# Patient Record
Sex: FEMALE | Race: WHITE | URBAN - METROPOLITAN AREA
[De-identification: names, ages, dates, MRNs, and addresses within clinical notes are randomized per-mention and may not be internally consistent; named-entity substitution may affect disease eponyms.]

---

## 2020-08-04 ENCOUNTER — HOSPITAL ENCOUNTER (OUTPATIENT)
Dept: BEHAVIORAL HEALTH | Facility: CLINIC | Age: 55
Discharge: HOME | End: 2020-08-04
Attending: PSYCHIATRY & NEUROLOGY | Admitting: PSYCHIATRY & NEUROLOGY
Payer: COMMERCIAL

## 2020-08-04 PROCEDURE — 90791 PSYCH DIAGNOSTIC EVALUATION: CPT | Mod: 95 | Performed by: COUNSELOR

## 2020-08-05 ENCOUNTER — MYC MEDICAL ADVICE (OUTPATIENT)
Dept: PSYCHOLOGY | Facility: CLINIC | Age: 55
End: 2020-08-05

## 2020-08-05 ENCOUNTER — BEH TREATMENT PLAN (OUTPATIENT)
Dept: BEHAVIORAL HEALTH | Facility: CLINIC | Age: 55
End: 2020-08-05
Attending: PSYCHIATRY & NEUROLOGY

## 2020-08-05 DIAGNOSIS — F43.23 ADJUSTMENT DISORDER WITH MIXED ANXIETY AND DEPRESSED MOOD: ICD-10-CM

## 2020-08-05 RX ORDER — BUPROPION HYDROCHLORIDE 150 MG/1
150 TABLET ORAL EVERY MORNING
Status: ON HOLD | COMMUNITY
End: 2023-07-15

## 2020-08-06 NOTE — PROGRESS NOTES
"Outpatient Mental Health Services - Adult    MY COPING PLAN FOR SAFETY    PATIENT'S NAME: Kallie Estrada  MRN:   [id number]     SAFETY PLAN:    Step 1: Warning signs / cues (Thoughts, images, mood, situation, behavior) that a crisis may be developing:      Thoughts: \"Nothing makes it better\" She feels she lost her will to live.     Images: flashbacks    Thinking Processes: ruminations (can't stop thinking about my problems) and intrusive thoughts (bothersome, unwanted thoughts that come out of nowhere)    Mood: worsening depression, hopelessness, helplessness and intense worry    Behaviors: using alcohol, not taking care of myself, not taking care of my responsibilities, sleeping too much and not sleeping enough    Situations: loss, relationship problems, trauma  and relapse       Step 2: Coping strategies - Things I can do to take my mind off of my problems without contacting another person (relaxation technique, physical activity):      Distress Tolerance Strategies:  watch a movie    Physical Activities: go for a walk, redecorate    Focus on helpful thoughts:  \"This is temporary\" and \"I will get through this\"    Step 3: People and social settings that provide distraction:     Name: oldest daughter Phone: in phone   Name: new male friend Phone: in phone   park     Step 4: Remind myself of people and things that are important to me and worth living for:  Her daughters.     Step 5: When I am in crisis, I can ask these people to help me use my safety plan:     Name: new male friend Phone: in phone   Name: sister Phone: in phone    Step 6: Making the environment safe:       remove alcohol and be around others    Step 7: Professionals or agencies I can contact during a crisis:      Suicide Prevention Lifeline: 4-693-558-TALK (4879)    Crisis Text Line Service (available 24 hours a day, 7 days a week): Text [place] to 221435    Call  **CRISIS (831106) from a cell phone to talk to a team of professionals who can help " you.    Crisis Services By County: Phone Number:   [place]     [phone]   [place]    [phone]   [place]    [phone]   [place]    [phone]   [place]    [phone]   [place] [phone]   [place]     [phone]       Call 911 or go to my nearest emergency department.     I helped develop this safety plan and agree to use it when needed.  I have been given a copy of this plan.        Today s date:  8/4/2020  Adapted from Safety Plan Template 2008 Camila Hernandez and Emilio Carter is reprinted with the express permission of the authors.  No portion of the Safety Plan Template may be reproduced without the express, written permission.  You can contact the authors at [email or web address] or [email or web address]

## 2020-08-10 ENCOUNTER — TELEPHONE (OUTPATIENT)
Dept: BEHAVIORAL HEALTH | Facility: CLINIC | Age: 55
End: 2020-08-10

## 2020-08-10 ASSESSMENT — ANXIETY QUESTIONNAIRES
5. BEING SO RESTLESS THAT IT IS HARD TO SIT STILL: NOT AT ALL
2. NOT BEING ABLE TO STOP OR CONTROL WORRYING: NOT AT ALL
GAD7 TOTAL SCORE: 4
1. FEELING NERVOUS, ANXIOUS, OR ON EDGE: NEARLY EVERY DAY
1. FEELING NERVOUS, ANXIOUS, OR ON EDGE: NEARLY EVERY DAY
3. WORRYING TOO MUCH ABOUT DIFFERENT THINGS: NOT AT ALL
IF YOU CHECKED OFF ANY PROBLEMS ON THIS QUESTIONNAIRE, HOW DIFFICULT HAVE THESE PROBLEMS MADE IT FOR YOU TO DO YOUR WORK, TAKE CARE OF THINGS AT HOME, OR GET ALONG WITH OTHER PEOPLE: SOMEWHAT DIFFICULT
7. FEELING AFRAID AS IF SOMETHING AWFUL MIGHT HAPPEN: NOT AT ALL
3. WORRYING TOO MUCH ABOUT DIFFERENT THINGS: NOT AT ALL
IF YOU CHECKED OFF ANY PROBLEMS ON THIS QUESTIONNAIRE, HOW DIFFICULT HAVE THESE PROBLEMS MADE IT FOR YOU TO DO YOUR WORK, TAKE CARE OF THINGS AT HOME, OR GET ALONG WITH OTHER PEOPLE: SOMEWHAT DIFFICULT
5. BEING SO RESTLESS THAT IT IS HARD TO SIT STILL: NOT AT ALL
GAD7 TOTAL SCORE: 4
2. NOT BEING ABLE TO STOP OR CONTROL WORRYING: NOT AT ALL
6. BECOMING EASILY ANNOYED OR IRRITABLE: SEVERAL DAYS
7. FEELING AFRAID AS IF SOMETHING AWFUL MIGHT HAPPEN: NOT AT ALL
6. BECOMING EASILY ANNOYED OR IRRITABLE: SEVERAL DAYS

## 2020-08-10 ASSESSMENT — PATIENT HEALTH QUESTIONNAIRE - PHQ9
5. POOR APPETITE OR OVEREATING: NOT AT ALL
SUM OF ALL RESPONSES TO PHQ QUESTIONS 1-9: 8
5. POOR APPETITE OR OVEREATING: NOT AT ALL

## 2020-08-10 NOTE — PROGRESS NOTES
"Outpatient Mental Health Services - Adult     MY COPING PLAN FOR SAFETY     PATIENT'S NAME:    Kallie Estrada  MRN:                           [id number]     SAFETY PLAN:     Step 1: Warning signs / cues (Thoughts, images, mood, situation, behavior) that a crisis may be developing:     ? Thoughts: \"Nothing makes it better\" She feels she lost her will to live.   ? Images: flashbacks  ? Thinking Processes: ruminations (can't stop thinking about my problems) and intrusive thoughts (bothersome, unwanted thoughts that come out of nowhere)  ? Mood: worsening depression, hopelessness, helplessness and intense worry  ? Behaviors: using alcohol, not taking care of myself, not taking care of my responsibilities, sleeping too much and not sleeping enough  ? Situations: loss, relationship problems, trauma  and relapse   ?    Step 2: Coping strategies - Things I can do to take my mind off of my problems without contacting another person (relaxation technique, physical activity):     ? Distress Tolerance Strategies:  watch a movie  ? Physical Activities: go for a walk, redecorate  ? Focus on helpful thoughts:  \"This is temporary\" and \"I will get through this\"     Step 3: People and social settings that provide distraction:                 Name: oldest daughter           Phone: in phone              Name: new male friend           Phone: in phone              park          Step 4: Remind myself of people and things that are important to me and worth living for:  Her daughters.      Step 5: When I am in crisis, I can ask these people to help me use my safety plan:                 Name: new male friend           Phone: in phone              Name: sister    Phone: in phone     Step 6: Making the environment safe:      ? remove alcohol and be around others     Step 7: Professionals or agencies I can contact during a crisis:     ? Suicide Prevention Lifeline: 0-372-134-TALK (2222)  ? Crisis Text Line Service (available 24 hours a day, " 7 days a week): Text [place] to 824604  ? Call  **CRISIS (119628) from a cell phone to talk to a team of professionals who can help you.          Crisis Services By [facility]: Phone Number:   [place]     [phone]   [place]    [phone]   [place]    [phone]   [place]    [phone]   [place]    [phone]   [place] [phone]   [place]     [phone]      ? Call 911 or go to my nearest emergency department.             I helped develop this safety plan and agree to use it when needed.  I have been given a copy of this plan.          Today s date:  8/4/2020  Adapted from Safety Plan Template 2008 Camila Hernandez and Emilio Carter is reprinted with the express permission of the authors.  No portion of the Safety Plan Template may be reproduced without the express, written permission.  You can contact the authors at [email or web address] or [email or web address]

## 2020-08-10 NOTE — TELEPHONE ENCOUNTER
Admission Date: 8/11/2020    Identify any current concerns with potential impact to admission:     medication/medical concerns: No     immediate safety concerns:No    Does patient have safety plan? Yes  Note: Please copy safety plan copied into BEH Encounter     Other (insurance/childcare/transportation/housing/planned absences/etc): N/A    Patient's insurance is: BCBS . Does patient need appointment with provider? No    If patient has Medical Assistance (MA) is LOCUS and Functional Assessment completed? N/A    If patient is in Partial Hospitalization Program is LOCUS completed? N/A                                                                              Completed by: Octaviano NEELY/RICHAR

## 2020-08-11 ENCOUNTER — HOSPITAL ENCOUNTER (OUTPATIENT)
Dept: BEHAVIORAL HEALTH | Facility: CLINIC | Age: 55
End: 2020-08-11
Attending: PSYCHIATRY & NEUROLOGY
Payer: COMMERCIAL

## 2020-08-11 PROBLEM — F43.23 ADJUSTMENT DISORDER WITH MIXED ANXIETY AND DEPRESSED MOOD: Status: ACTIVE | Noted: 2020-08-11

## 2020-08-11 PROCEDURE — G0177 OPPS/PHP; TRAIN & EDUC SERV: HCPCS | Mod: 95

## 2020-08-11 PROCEDURE — 90853 GROUP PSYCHOTHERAPY: CPT | Mod: GT

## 2020-08-11 ASSESSMENT — ANXIETY QUESTIONNAIRES: GAD7 TOTAL SCORE: 4

## 2020-08-11 ASSESSMENT — PATIENT HEALTH QUESTIONNAIRE - PHQ9
SUM OF ALL RESPONSES TO PHQ QUESTIONS 1-9: 6
SUM OF ALL RESPONSES TO PHQ QUESTIONS 1-9: 6

## 2020-08-11 NOTE — PROGRESS NOTES
Adult Day Treatment Program:  Individualized Treatment Plan     Date of Plan: 20    Name: Kallie Estrada MRN: [id number]    : 1965    Programs:  Adult Day Treatment (ADT)     Clinical Track (if applicable):  2B    DSM5 Diagnosis  309.28 (F43.23) With mixed anxiety and depressed mood. 311 (F32.9) Unspecified Depressive Disorder      303.90 (F10.20) Substance-Related & Addictive Disorders Alcohol Use Disorder- Severe    Adult Day Treatment Program Multidisciplinary Team Members: Dima Giraldo MD and/or Dr. Neeta Anderson; Tayler [facility]iraldo [facility], Rickey Valdez, MOTR/L, Octaviano Meeks, OTR/L, Citlali Townsend RN, BSN    Kallie Estrada will participate in the Adult Day Treatment Program  3 days per week, 3 hours per day. Anticipated duration/discharge: 12 weeks    Due to COVID-19, services will be delivered via telemedicine until further notice.     Program Start Date: 20  Anticipated Discharge Date: 20 (pending authorization/clinical changes)    NOTE: Complete CGI     Review Date: Does Kallie Estrada continue to meet criteria to participate in the Adult Day Treatment Program, 3 days per week; 3 hours per day?   10/13/20 yes discharge 20                               Client Strengths:  has a previous history of therapy and support of family, friends and providers    Client Participation in Plan:  Contributed to goals and plan     Areas of Vulnerability:  Anxiety  Depressive symptoms   Trauma/Abuse/Neglect    Long-Term Goals:  Knowledge about illness and management of symptoms   Maintenance of personal safety   Maintenance of sobriety     Abuse Prevention Plan:  Safe, therapeutic environment   Safety coping plan as needed   Education regarding illness and skill development   Coordination with care providers     Discharge Criteria:  Satisfactory progress toward treatment goals   Improvement re: identified problems and symptoms   Ability to continue recovery at next level of service    Has a discharge plan in place   Has safety/coping plan in place   Ability to maintain sobriety     Areas of Treatment Focus        Area of Treatment Focus:   Personal Safety  Start Date:    8/18/20    Goal:  Target Date: 10/13/20 Status: Stopped  Kallie will notify staff when needing assistance to develop or implement a coping plan to manage suicidal or self injurious urges.Use coping plan for safety, as needed.  Maintain sobriety.      Progress:   8/24/20 - Pt will be going into a JAREK program through [facility].  Goals Stopped and Pt discharged.        Treatment Strategies:   Assess / reassess level of potential for harm to self or others  Engage in safety planning when indicated  Facilitate increased self awareness          Area of Treatment Focus:   Symptom Stabilization and Management  Start Date:    8./18/20    Goal:  Target Date: 10/13/20 Status: Stopped  When in life skills Kallie  will provide an update related to perceived progress with mental health recovery weekly.      Progress:    8/24/20 - Pt will be going into a JAREK program through [facility].  Goals Stopped and Pt discharged.        Treatment Strategies:   Facilitate increased self awareness  Provide education regarding startegies and coping skills to assist with mental health recovery            Area of Treatment Focus:   Wellness and Mental Health  Start Date:    8/18/20    Goal:  Target Date: 10/13/20 Status: Stopped  Kallie will improve wellness related behaviors by getting enough sleep,exercise, balanced nutrition and take medications (if prescribed) to maintain good mental health.      Progress:   8/24/20 - Pt will be going into a JAREK program through [facility].  Goals Stopped and Pt discharged.           Treatment Strategies:   Facilitate increased self awareness  Provide education regarding wellness habits and routines which can asssit with mental health recovery            Area of Treatment Focus:   Community  "Resources / Support and Discharge Planning  Start Date:    8/18/20    Goal:  Target Date: 10/13/20 Status: Stopped  Kallie will establish an aftercare plan to include medical providers and social supports by discharge.      Progress:   8/24/20 - Pt will be going [facility]  Goals Stopped and Pt discharged.           Treatment Strategies:   Facilitate increased self awareness  Provide education regarding relapse prevention,discharge planning and mental health support          Area of Treatment Focus:   Symptom Stabilization and Management  Start Date:    8/18/20    Goal:  Target Date: 10/13/20 Status: Stopped        Progress:   Not applicable        Treatment Strategies:        Jeff Meeks, OTR/L      NOTE: Required signatures are completed manually and scanned into the electronic medical record. See \"Media\" tab in epic.    The Individualized Treatment Plan Signature Page has been routed to the provider for co-sign.      "

## 2020-08-11 NOTE — GROUP NOTE
Psychotherapy Group Note    PATIENT'S NAME: Kallie Estrada  MRN:   [id number]  :   1965  ACCT. NUMBER: [id number]  DATE OF SERVICE: 20  START TIME:  3:00 PM  END TIME:  3:50 PM  FACILITATOR: Tayler Giraldo  TOPIC:  EBP Group: Cognitive Restructuring  Adult Mental Health Day Treatment  TRACK: 2B    NUMBER OF PARTICIPANTS: 7    Summary of Group / Topics Discussed:  Cognitive Restructuring: Core Beliefs: Patients received an overview of what a core belief is, and how they develop. Patients then began to identify their negative core beliefs. Patients worked to modify core beliefs with the goal of improved self-image and functioning.     Patient Session Goals / Objectives:    Familiarize self with the concept of core beliefs and how they develop.      Explore personal core beliefs (positive and negative)    Develop / advance recognition of the connection between negative thoughts and negative core beliefs.    Formulate new neutral/positive core beliefs               Patient Participation / Response:  Minimally participated, only when prompted / asked.    Demonstrated understanding of topics discussed through group discussion and participation    Treatment Plan:  Patient has an initial individualized treatment plan that was created as part of their diagnostic assessment / admission process.  A master individualized treatment plan is in the process of being developed with the patient and multi-disciplinary care team.    Tayler Giraldo

## 2020-08-11 NOTE — GROUP NOTE
Process Group Note    PATIENT'S NAME: Kallie Estrada  MRN:   [id number]  :   1965  ACCT. NUMBER: [id number]  DATE OF SERVICE: 20  START TIME:  1:00 PM  END TIME:  1:50 PM  FACILITATOR: Tayler Giraldo  TOPIC:  Process Group    Diagnoses:  309.28 (F43.23) With mixed anxiety and depressed mood. 311 (F32.9) Unspecified Depressive Disorder      303.90 (F10.20) Substance-Related & Addictive Disorders Alcohol Use Disorder- Severe    Adult Mental Health Day Treatment  TRACK: 2B    Telemedicine Visit: The patient's condition can be safely assessed and treated via synchronous audio and visual telemedicine encounter.      Reason for Telemedicine Visit:  covid 19    Originating Site (Patient Location): Patient's home    Distant Site (Provider Location): Provider Remote Setting    Consent:  The patient/guardian has verbally consented to: the potential risks and benefits of telemedicine (video visit) versus in person care; bill my insurance or make self-payment for services provided; and responsibility for payment of non-covered services.     Mode of Communication:  Video Conference via [employer]    As the provider I attest to compliance with applicable laws and regulations related to telemedicine.      NUMBER OF PARTICIPANTS: 7          Data:    Session content: At the start of this group, patients were invited to check in by identifying themselves, describing their current emotional status, and identifying issues to address in this group.   Area(s) of treatment focus addressed in this session included Symptom Management, Personal Safety, Develop / Improve Independent Living Skills and Develop Socialization / Interpersonal Relationship Skills.  Kallie reported being sober 10 years and relapsing after a series of stressful events including her ex  had twins in his late 50s with his young girlfriend and she and their daughters did not find out until a month before the babies were due, a friend ,  among other stressors.  She presented as tangential and difficult to redirect.  She reported a good relationship with her daughters and denied suicidal ideation.    Therapeutic Interventions/Treatment Strategies:  Psychotherapist offered support, feedback and validation and reinforced use of skills. Treatment modalities used include Motivational Interviewing, Cognitive Behavioral Therapy and Dialectical Behavioral Therapy. Validated and normalized.  Highlighted and reinforced skills used; connected to positive outcomes.  Provided additional skill suggestions.  Aided in creating a plan to help work towards goals.        Assessment:    Patient response:   Patient responded to session by accepting feedback, listening and accepting support    Possible barriers to participation / learning include: and no barriers identified    Health Issues:   None reported       Substance Use Review:   Substance Use: No active concerns identified.    Mental Status/Behavioral Observations  Appearance:   Appropriate   Eye Contact:   Good   Psychomotor Behavior: Normal   Attitude:   Cooperative   Orientation:   All  Speech   Rate / Production: Talkative   Volume:  Normal   Mood:    Depressed   Affect:    Appropriate   Thought Content:   Rumination  Thought Form:  Flight of Ideas  Tangential     Insight:    Fair     Plan:     Safety Plan: No current safety concerns identified.  Recommended that patient call 911 or go to the local ED should there be a change in any of these risk factors.     Barriers to treatment: None identified    Patient Contracts (see media tab):  None    Substance Use: Not addressed in session     Continue or Discharge: Patient will continue in Adult Day Treatment (ADT)  as planned. Patient is likely to benefit from learning and using skills as they work toward the goals identified in their treatment plan.      Tayler Giraldo  August 11, 2020

## 2020-08-11 NOTE — GROUP NOTE
Psychoeducation Group Note    PATIENT'S NAME: Kallie Estrada  MRN:   [id number]  :   1965  ACCT. NUMBER: [id number]  DATE OF SERVICE: 20  START TIME:  2:00 PM  END TIME:  2:50 PM  FACILITATOR: Rickey Valdez OT  TOPIC: MH Life Skills Group: Sensory Approaches in Mental Health  Adult Mental Health Day Treatment  TRACK: 2B    Telemedicine Visit: The patient's condition can be safely assessed and treated via synchronous audio and visual telemedicine encounter.      Reason for Telemedicine Visit: Services only offered telehealth and Covid 19    Originating Site (Patient Location): Patient's home    Distant Site (Provider Location): [facility]: [facility]    Consent:  The patient/guardian has verbally consented to: the potential risks and benefits of telemedicine (video visit) versus in person care; bill my insurance or make self-payment for services provided; and responsibility for payment of non-covered services.     Mode of Communication:  Video Conference via Outcome Referrals    As the provider I attest to compliance with applicable laws and regulations related to telemedicine.      NUMBER OF PARTICIPANTS: 7    Summary of Group / Topics Discussed:  Sensory Approaches in Mental Health:  Neurosensory Enhanced Mindfulness: Summoning Seasonal Sensory Memories for self regulation.      Patients were provided with psychoeducation on how using sensory memories can be a helpful strategy to improve a moment or low mood by encouraging a positive thought process using the principles of neuroplasticity. Using the four seasons as a framework, patients were provided with an experiential opportunity to explore and tune into and identify specific soothing or engaging sensory input for each season. They then identified a positive sensory memory for each season to summon in the future when they would benefit from a positive thought process to help self regulate. Also explored was the benefit of  daily/weekly/seasonal activity rhythms and how they help us pass our time and organize our brain to maintain mental health. Facilitated reflection with group members and validation provided.        Patient Session Goals / Objectives:    Identified how using sensory enhanced mindfulness practices can be used for grounding, stress management, and self regulation      Improved awareness of different types of sensory enhanced mindfulness activities that assist with healthy coping of stress and symptoms      Established a plan for practice of these skills in their own environments    Practiced and reflected on how to generalize taught skills to their everyday life        Patient Participation / Response:  Moderately participated, sharing some personal reflections / insights and adequately adequately received / provided feedback with other participants.    Patient presentation: First day in program. Shared appropriately. , Verbalized understanding of content and Patient would benefit from additional opportunities to practice the content to be able to generalize it to their everyday life with increased intentionality, consistency, and efficacy in support of their psychiatric recovery    Treatment Plan:  Patient has an initial individualized treatment plan that was created as part of their diagnostic assessment / admission process.  A master individualized treatment plan is in the process of being developed with the patient and multi-disciplinary care team.    Rickey Valdez, OT

## 2020-08-11 NOTE — DISCHARGE SUMMARY
"       Adult Mental Health Intensive Outpatient Discharge Summary/Instructions      Patient: Kallie Estrada MRN: [id number]   : 1965 Age: 55 year old Sex: female     Admission Date: 20  Discharge Date: 20    Diagnosis: 309.28 (F43.23) With mixed anxiety and depressed mood. 311 (F32.9) Unspecified Depressive Disorder      303.90 (F10.20) Substance-Related & Addictive Disorders Alcohol Use Disorder- Severe    Focus of Treatment / Progress    Personal Safety: Patient discharged to pursue [facility].      * Follow your safety plan     * Call crisis lines as needed:    [facility] [phone] [facility] [phone]  [facility] [phone] Crisis Connection [phone]  [facility] [phone] [facility] [phone]  [facility] [phone] National Suicide Prevention [phone]  [facility] [phone] Suicide Prevention [phone]  [facility] [phone]    Managing symptoms of:  Anxiety, depression, Alcohol use d/o    Community support/health:  [place] [address] ([phone]) [email or web address], [place] Crisis text line( Text [place] to 480487),  [facility]  [place], [place] ([phone])  [facility] [place], [place] ( [phone])     Managing Symptoms and Preventing Relapse    * Go to all of your appointments    * Take all medications as directed.      * Carry a current list if medication with you    * Do not use illicit (street) drugs.  Avoid alcohol    * Report these symptoms to your care team. These are early signs of relapse:   Thoughts of suicide   Losing more sleep   Increased confusion   Mood getting worse   Feeling more aggressive   Other:  As noted    *Use these skills daily:  Talk to someone you trust at least one time weekly, set boundaries and say \"no\", be assertive, act opposite of negative feelings, accept challenges with a positive attitude, exercise at least three times per week for " 30 minutes,  get enough sleep, eat healthy foods, get into a good routine    Copy of summary sent to: In Epic    Follow up with psychiatrist / main caregiver: See chart    Next visit: see chart, unable to connect with patient    Follow up with your therapist: see chart   Next visit: see chart    Go to group therapy and / or support groups at: [facility] Connection and Depression Bipolar Support [place](DBSA) support groups    See your medical doctor about:  For an annual physical exam or any general wellness or illness as needed.    Other:  Your treatment team appreciates having the opportunity to work with you and wishes you the best.    Client Signature:__Unable to sign in person d/t [id number]___________  Date / Time:__8/26/20, 2:15 pm_________  Staff Signature:___Citlali Townsend RN on 8/26/2020 at 2:16 PM__   Date / Time:___________

## 2020-08-11 NOTE — PROGRESS NOTES
Acknowledgement of Current Treatment Plan       I have reviewed my treatment plan with my therapist / counselor on 8/18/20.   I agree with the plan as it is written in the electronic health record. (2B)    Name:      Signature:  Kallie Estrada   Unable to sign in person d/t COVID19   Dr Dima Giraldo MD  Psychiatrist    Dr. Neeta Anderson, [facility],     Tayler Giraldo, [facility]  Psychotherapist

## 2020-08-12 ASSESSMENT — PATIENT HEALTH QUESTIONNAIRE - PHQ9: SUM OF ALL RESPONSES TO PHQ QUESTIONS 1-9: 6

## 2020-08-13 ENCOUNTER — TELEPHONE (OUTPATIENT)
Dept: BEHAVIORAL HEALTH | Facility: CLINIC | Age: 55
End: 2020-08-13

## 2020-08-13 NOTE — TELEPHONE ENCOUNTER
Writer called to conduct RN health screen. Unable to leave  requesting call back d/t  mailbox being full. Will reattempt.  Citlali Townsend RN on 8/13/2020 at 9:33 AM

## 2020-08-20 ENCOUNTER — HOSPITAL ENCOUNTER (OUTPATIENT)
Dept: BEHAVIORAL HEALTH | Facility: CLINIC | Age: 55
Discharge: HOME | End: 2020-08-20
Attending: FAMILY MEDICINE | Admitting: FAMILY MEDICINE
Payer: COMMERCIAL

## 2020-08-20 ENCOUNTER — TELEPHONE (OUTPATIENT)
Dept: BEHAVIORAL HEALTH | Facility: CLINIC | Age: 55
End: 2020-08-20

## 2020-08-20 VITALS — BODY MASS INDEX: 22.71 KG/M2 | WEIGHT: 133 LBS | HEIGHT: 64 IN

## 2020-08-20 DIAGNOSIS — F10.20 ALCOHOL USE DISORDER, SEVERE, DEPENDENCE (H): ICD-10-CM

## 2020-08-20 PROCEDURE — H0001 ALCOHOL AND/OR DRUG ASSESS: HCPCS | Mod: HF,95

## 2020-08-20 ASSESSMENT — PAIN SCALES - GENERAL: PAINLEVEL: NO PAIN (0)

## 2020-08-20 ASSESSMENT — ANXIETY QUESTIONNAIRES
GAD7 TOTAL SCORE: 1
3. WORRYING TOO MUCH ABOUT DIFFERENT THINGS: SEVERAL DAYS
1. FEELING NERVOUS, ANXIOUS, OR ON EDGE: NOT AT ALL
2. NOT BEING ABLE TO STOP OR CONTROL WORRYING: NOT AT ALL
5. BEING SO RESTLESS THAT IT IS HARD TO SIT STILL: NOT AT ALL
6. BECOMING EASILY ANNOYED OR IRRITABLE: NOT AT ALL
4. TROUBLE RELAXING: NOT AT ALL
7. FEELING AFRAID AS IF SOMETHING AWFUL MIGHT HAPPEN: NOT AT ALL

## 2020-08-20 ASSESSMENT — PATIENT HEALTH QUESTIONNAIRE - PHQ9: SUM OF ALL RESPONSES TO PHQ QUESTIONS 1-9: 2

## 2020-08-20 ASSESSMENT — MIFFLIN-ST. JEOR: SCORE: 1183.28

## 2020-08-20 NOTE — PROGRESS NOTES
8/20/2020    Kallie [email or web address]    Dear Kallie,      It was a pleasure meeting with you today on August 20, 2020 for your Chemical Dependency Evaluation.      Based on your evaluation the recommendations are as follows:       1) Complete a Co-occurring Intensive Outpatient CD Treatment Program, such as those offered by [facility].  2)Continue to work your current recovery program but be mindful of becoming complacent in your recovery.   3)  Abstain from all mood-altering chemicals unless prescribed by a licensed provider.   4)  Actively work with a female mentor/sponsor on a weekly basis.   5)  Follow all the recommendations of your treatment/medical providers.  6)  Establish care with a psychiatrist and attend individual psychotherapy appointments.    7)  Continue to use your Sober Link breathalyzer.  8)  Should you relapse, consider a higher level of care and perhaps anti-craving medication.    You have the number for [facility] to establish care with a therapist ([phone]).      An Intensive Outpatient counselor should be contacting you within a day or two.     If I can be of further service, please don't hesitate to e-mail me or leave a message at my office.     Sincerely wishing you the very best,       Nina Mims, MARTITA, LADC,CI  CD Evaluation Counselor  [facility]  [address] [place] [id number]  [email or web address]  Tel: [phone]  Fax: [phone]  Gender pronouns: she/hers  Employed by: [employer]    (securely e-mailed to the pt on 8/20/2020)

## 2020-08-20 NOTE — PROGRESS NOTES
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 her triggers as loneliness, her Ex and a desire to be numb from her feelings.  Pt is in need of sober coping skills. Pt is at some risk for relapse/continued use based on the struggles she is experiencing emotionally.  She currently has a breathalyzer she uses 4x a day and she feels that accountability is very helpful.       DIMENSION VI - Recovery Environment   1. Are you employed/attending school? Tell me about that.     The pt is currently unemployed.    2A. Describe a typical day; evening for you. Work, school, social, leisure, volunteer, spiritual practices. Include time spent obtaining, using, recovering from drugs or alcohol. (DSM)     \"Since I stopped drinking, I am back to being a Mom.  We have several kids over.  I am busy taking my daughters to their sports activities.  My kids are growing up and moving on and I am feeling lonely.\"    Please describe what leisure activities have been associated with your substance abuse:     \"In the past it was very social.  Recently, I would drink alone.\"    2B. How often do you spend more time than you planned using or use more than you planned? (DSM)     \"It had been daily.\"    3. How important is using to your social connections? Do many of your family or friends use? " "    '\"It really is not.\"    4A. Are you currently in a significant relationship?     No, I have been  for 10 years.    4C. Sexual Orientation:     Heterosexual    5A. Who do you live with?      Pt reports that she lives with her two youngest daughters in their own home.    5B. Tell me about their alcohol/drug use and mental health issues.     \"They have no issues.\"    5C. Are you concerned for your safety there? No    5D. Are you concerned about the safety of anyone else who lives with you? No    6A. Do you have children who live with you?     Yes.  (Ask follow-up questions to determine the person's relationship and responsibility, both legal and care giving; and what arrangements are made for supervision for the children when the person is not available.) \"My two youngest daughters live with me.\"    6B. Do you have children who do not live with you?     Yes.  (Ask follow-up questions to determine the person's relationship and responsibility, both legal and care giving; and what arrangements are made for supervision for the children when the person is not available.) \"I have a 26-yr-old is on her own.\"    7A. Who supports you in making changes in your alcohol or drug use? What are they willing to do to support you? Who is upset or angry about you making changes in your alcohol or drug use? How big a problem is this for you?      \"my children, friends and my mom, my sister.\"    7B. This table is provided to record information about the person s relationships and available support It is not necessary to ask each item; only to get a comprehensive picture of their support system.  How often can you count on the following people when you need someone?   Partner / Spouse The patient does not have a current partner or spouse.   Parent(s)/Aunt(s)/Uncle(s)/Grandparents Always supportive   Sibling(s)/Cousin(s) Always supportive   Child(juan) Always supportive   Other relative(s) Always supportive " "  Friend(s)/neighbor(s) Always supportive   Child(juan) s father(s)/mother(s) Never supportive   Support group member(s) The patient denied having any current involvement with 12-step or other support group meetings.   Community of monique members The patient denied having any current involvement with community monique members.   /counselor/therapist/healer The patient denied having any current involvement with a , counselor, therapist or healer.   Other (specify) No     8A. What is your current living situation?     Pt reports that she lives with her two youngest daughters in their own home.    8B. What is your long term plan for where you will be living?     'I am happy where I am now, but I may move after my 15-year-old moves out.\"    8C. Tell me about your living environment/neighborhood? Ask enough follow up questions to determine safety, criminal activity, availability of alcohol and drugs, supportive or antagonistic to the person making changes.      The pt reports no concerns.    9. Criminal justice history: Gather current/recent history and any significant history related to substance use--Arrests? Convictions? Circumstances? Alcohol or drug involvement? Sentences? Still on probation or parole? Expectations of the court? Current court order? Any sex offenses - lifetime? What level? (DSM)    None    10. What obstacles exist to participating in treatment? (Time off work, childcare, funding, transportation, pending [facility] time, living situation)     The patient denied having any obstacles for participating in substance abuse treatment.    Dimension VI Ratings   Recovery environment - The placing authority must use the criteria in Dimension VI to determine a client s recovery environment.   RISK DESCRIPTIONS - Severity ratin Client is engaged in structured, meaningful activity, but peers, family, significant other, and living environment are unsupportive, or there is criminal justice " "involvement by the client or among the client's peers, significant others, or in the client's living environment.    REASONS SEVERITY WAS ASSIGNED - (What support does the person have for making changes? What structure/stability does the person have in his or her daily life that will increase the likelihood that changes can be sustained? What problems exist in the person s environment that will jeopardize getting/staying clean and sober?)     Family/friends/relationships; \"I am very close with my family.  I have some good friends.  My best friend recently dies.\"Legal; Pt reports she has no legal issues.  Financial; \"I am secure.\"Educational; Pt has some college and is satisfied with her level of education.Recreational/ leisure; \"This past year I have turned inward.  I used to play tennis, golf and work out.  I like to volunteer\"Vocational/employment; Pt reports that she is currently unemployed.  She is considering employment to keep busy.  Living arrangements (DSM): Pt reports that she lives with her two youngest daughters in their own home.  The pt is willing to build her support system by attending an IOP program and feels this is more important as her daughters grow up and move on.         Client Choice/Exceptions   Would you like services specific to language, age, gender, culture, Confucianism preference, race, ethnicity, sexual orientation or disability?  No    What particular treatment choices and options would you like to have? Co-occurring IOP.    Do you have a preference for a particular treatment program? [facility].    Criteria for Diagnosis     Criteria for Diagnosis  DSM-5 Criteria for Substance Use Disorder  Instructions: Determine whether the client currently meets the criteria for Substance Use Disorder using the diagnostic criteria in the DSM-V pp.481-589. Current means during the most recent 12 months outside a facility that controls access to substances    Category of Substance " Severity (ICD-10 Code / DSM 5 Code)     Alcohol Use Disorder Severe  (10.20) (303.90)   Cannabis Use Disorder The patient does not meet the criteria for a Cannabis use disorder.   Hallucinogen Use Disorder The patient does not meet the criteria for a Hallucinogen use disorder.   Inhalant Use Disorder The patient does not meet the criteria for an Inhalant use disorder.   Opioid Use Disorder The patient does not meet the criteria for an Opioid use disorder.   Sedative, Hypnotic, or Anxiolytic Use Disorder The patient does not meet the criteria for a Sedative/Hypnotic use disorder.   Stimulant Related Disorder The patient does not meet the criteria for a Stimulant use disorder.   Tobacco Use Disorder The patient does not meet the criteria for a Tobacco use disorder.   Other (or unknown) Substance Use Disorder The patient does not meet the criteria for a Other (or unknown) Substance use disorder.       Collateral Contact Summary   Number of contacts made: none    Contact with referring person:  Yes    If court related records were reviewed, summarize here: No court records had been reviewed at the time of this documentation.      Rule 25 Assessment Summary and Plan   's Recommendation    1) Complete a Co-occurring  Intensive Outpatient CD Treatment Program, such as those offered by [facility].  2)Continue to work your current recovery program but be mindful of becoming complacent in your recovery.   3)  Abstain from all mood-altering chemicals unless prescribed by a licensed provider.   4)  Actively work with a female mentor/sponsor on a weekly basis.   5)  Follow all the recommendations of your treatment/medical providers.  6)  Establish care with a psychiatrist and attend  individual psychotherapy appointments.    7)  Continue to use your [facility] breathalyzer.  8)  Should you relapse, consider a higher level of care and perhaps anti-craving medication.    Collateral Contacts     Name    [facility]  [facility] EMR   Relationship    NA Phone Number    NA Releases    NA     Information Provided:  This writer reviewed this patients Electronic Medical Record and the information reviewed largely supports the information the patient reported during their CD evaluation.        A problematic pattern of alcohol/drug use leading to clinically significant impairment or distress, as manifested by at least two of the following, occurring within a 12-month period:    1.) Alcohol/drug is often taken in larger amounts or over a longer period than was intended.  2.) There is a persistent desire or unsuccessful efforts to cut down or control alcohol/drug use  3.) A great deal of time is spent in activities necessary to obtain alcohol, use alcohol, or recover from its effects.  6.) Continued alcohol use despite having persistent or recurrent social or interpersonal problems caused or exacerbated by the effects of alcohol/drug.  7.) Important social, occupational, or recreational activities are given up or reduced because of alcohol/drug use.  9.) Alcohol/drug use is continued despite knowledge of having a persistent or recurrent physical or psychological problem that is likely to have been caused or exacerbated by alcohol.  10.) Tolerance, as defined by either of the following: A need for markedly increased amounts of alcohol/drug to achieve intoxication or desired effect.  11.) Withdrawal, as manifested by either of the following: The characteristic withdrawal syndrome for alcohol/drug (refer to Criteria A and B of the criteria set for alcohol/drug withdrawal).      Specify if: In early remission:  After full criteria for alcohol/drug use disorder were previously met, none of the criteria for alcohol/drug use disorder have been met for at least 3 months but for less than 12 months (with the exception that Criterion A4,  Craving or a strong desire or urge to use alcohol/drug  may be met).     In sustained remission:   After full  criteria for alcohol use disorder were previously met, non of the criteria for alcohol/drug use disorder have been met at any time during a period of 12 months or longer (with the exception that Criterion A4,  Craving or strong desire or urge to use alcohol/drug  may be met).   Specify if:   This additional specifier is used if the individual is in an environment where access to alcohol is restricted.    Mild: Presence of 2-3 symptoms  Moderate: Presence of 4-5 symptoms  Severe: Presence of 6 or more symptoms

## 2020-08-20 NOTE — PROGRESS NOTES
[facility]  [address] [place] [id number]                ADULT CD ASSESSMENT ADDENDUM      Patient Name: Kallie Estrada  Cell Phone:   Home: [phone] (home)    Mobile:   Telephone Information:   Mobile [phone]     The pt gives verbal consent for TIFF to and from:      Herself, Email:  [email or web address]    Her Emergency Contact: Liyah Whitley (sister) [phone]        Her daughter as  2nd Emergency Contact: Kadie Estrada , Tel: [phone]    The patient reported being:      With which race do you identify? White    Initial Screening Questions     1. Are you currently having severe withdrawal symptoms that are putting yourself or others in danger?  No    2. Are you currently having severe medical problems that require immediate attention?  No    3. Are you currently having severe emotional or behavioral problems that are putting yourself or others at risk of harm?  No    4. Do you have sufficient reading skills that will enable you to understand written materials, including the program rules and client rights materials?  Yes     Family History and other additional information     Who raised you? (parents, grandparents, adoptive parents, step-parents, etc.)    Both Parents    Please tell me what it was like growing up in your family. (please include any history of substance abuse, mental health issues, emotional/physical/sexual abuse, forms of discipline, and support)     Patient reported they grew up in [place], [place]. Her parents  when patient was aged 10, and patient lived with her mother and older sister and younger brother. Patient's mother shouldered all the parenting, which is why patient made her ex- take 50% responsibility after the divorce. Patient stated she is best friends with her sister and is very close with her mother. Patient stated she is her mother's favorite. She denied verbal, emotional, physical, and sexual abuse in her family.  "    Do you have any children or Stepchildren? Yes, explain: The pt has 3 daughters ages 26, 18 and 15.    Are you being investigated by Child Protection Services? No    Do you have a child protection worker, probation office or ?  No    How would you describe your current finances?  Doing okay    If you are having problems, (unpaid bills, bankruptcy, IRS problems) please explain:  No    If working or a student are you able to function appropriately in that setting? The pt is currently unemployed and not enrolled in school.     Describe your preferred learning style:  by hands-on practice    What are your some of your personal strengths?  \"I am a social person.  I like organizing events, volunteering, being a Mom, making a house a home.\"    Do you currently participate in community monique activities, such as attending Mandaeism, temple, Mu-ism or Restoration services?  The patient denied currently being involved in any community monique activities.    How does your spirituality impact your recovery?  \"I believe there is a Higher power and that helps.  I believe in the greater good.\"    Do you currently self-administer your medications?  Yes    Have you ever had to lie to people important to you about how much you torres?   No   Have you ever felt the need to bet more and more money?   No   Have you ever attempted treatment for a gambling problem?   No   Have you ever touched or fondled someone else inappropriately or forced them to have sex with you against their will?   No   Are you or have you ever been a registered sex offender?   No   Is there any history of sexual abuse in your family? No   Have you ever felt obsessed by your sexual behavior, such as having sex with many partners, masturbating often, using pornography often?   No     Have you ever received therapy or stayed in the hospital for mental health problems?   Yes, explain: \"therapy.\"     Have you ever hurt yourself, such as cutting, burning or " "hitting yourself?   No     Have you ever purged, binged or restricted yourself as a way to control your weight?   No     Are you on a special diet?   No     Do you have any concerns regarding your nutritional status?   No     Have you had any appetite changes in the last 3 months?   No   Have you had weight loss or weight gain of more than 10 lbs in the last 3 months?   If patient gained or lost more than 10 lbs, then refer to program RN / attending Physician for assessment.   No   Was the patient informed of BMI?    Normal, No Intervention   Yes   Have you engaged in any risk-taking behavior that would put you at risk for exposure to blood-borne or sexually transmitted diseases?   No   Do you have any dental problems?   Yes, Patient referred to go to their dentist.    Have you ever lived through any trauma or stressful life events?   Yes, explain: \"my divorce 10 years ago was very stressful.'   In the past month, have you had any of the following symptoms related to the trauma listed above? (dreams, intense memories, flashbacks, physical reactions, etc.)   'I do think about it and wonder about PTSD.\"   Have you ever believed people were spying on you, or that someone was plotting against you or trying to hurt you?   No   Have you ever believed someone was reading your mind or could hear your thoughts or that you could actually read someone's mind or hear what another person was thinking?   No   Have you ever believed that someone of some force outside of yourself was putting thoughts into your mind or made you act in a way that was not your usual self?  Have you ever though you were possessed?   No   Have you ever believed you were being sent special messages through the TV, radio or newspaper?   No   Have you ever heard things other people couldn't hear, such as voices or other noises?   No   Have you ever had visions when you were awake?  Or have you ever seen things other people couldn't see?   No   Do you have " a valid 's license?    Yes     PHQ-9, BART-7 and Suicide Risk Assessment   PHQ-9 on 8/20/2020 BART-7 on 8/20/2020   The patient's PHQ-9 score was 2 out of 27, indicating minimal depression.   The patient's BART-7 score was 1 out of 21, indicating minimal anxiety.       [place]-Suicide Severity Rating Scale   Suicide Ideation   1.) Have you ever wished you were dead or that you could go to sleep and not wake up?     Lifetime:  No   Past Month:  No     2.) Have you actually had any thoughts of killing yourself?   Lifetime:  No   Past Month:  No     3.) Have you been thinking about how you might do this?     Lifetime:  No   Past Month:  No     4.) Have you had these thoughts and had some intention of acting on them?     Lifetime:  No   Past Month:  No     5.) Have you started to work out the details of how to kill yourself?   Lifetime:  No   Past Month:  No     6.) Do you intend to carry out this plan?      Lifetime:  No   Past Month:  No     Intensity of Ideation   Intensity of ideation (1 being least severe, 5 being most severe):     Lifetime:  The patient denied ever having any suicidal thoughts in life.   Past Month:  The patient denied ever having any suicidal thoughts in life.     How often do you have these thoughts?  The patient denied ever having any suicidal thoughts in life.     When you have the thoughts how long do they last?  The patient denied ever having any suicidal thoughts in life.     Can you stop thinking about killing yourself or wanting to die if you want to?  The patient denied ever having any suicidal thoughts in life.     Are there things - anyone or anything (i.e. family, Orthodoxy, pain of death) that stopped you from wanting to die or acting on thoughts of suicide?  Does not apply     What sort of reasons did you have for thinking about wanting to die or killing yourself (ie end pain, stop how you were feeling, get attention or reaction, revenge)?  Does not apply     Suicidal Behavior    (Suicide Attempt) - Have you made a suicide attempt?     Lifetime:  The patient had never made a suicide attempt.   Past Month:  The patient had never made a suicide attempt.     Have you engaged in self-harm (non-suicidal self-injury)?  The patient denied having any history of engaging in self-harm (non-suicidal self-injury).     (Interrupted Attempt) - Has there been a time when you started to do something to end your life but someone or something stopped you before you actually did anything?  The patient denied having any history of an interrupted suicide attempt.     (Aborted or Self-Interrupted Attempt) - Has there been a time when you started to do something to try to end your life but you stopped yourself before you actually did anything?  The patient denied having any history of an aborted or self-interrupted suicide attempt.     (Preparatory Acts of Behavior) - Have you taken any steps towards making suicide attempt or preparing to kill yourself (such as collecting pills, getting a gun, giving valuables away or writing a suicide note)?  The patient denied having any history of taking any steps towards making a suicide attempt or preparing to kill self.     Actual Lethality/Medical Damage:  The patient denied ever making a suicidal attempt.       2008  [employer]  Used with permission by Dafne Allen, PhD.       Guide to C-SSRS Risk Ratings   NO IDEATION:  with no active thoughts IDEATION: with a wish to die. IDEATION: with active thoughts. Risk Ratings   If Yes No No 0 - Very Low Risk   If NA Yes No 1 - Low Risk   If NA Yes Yes 2 - Low/moderate risk   IDEATION: associated thoughts of methods without intent or plan INTENT: Intent to follow through on suicide PLAN: Plan to follow through on suicide Risk Ratings cont...   If Yes No No 3 - Moderate Risk   If Yes Yes No 4 - High Risk   If Yes Yes Yes 5 - High Risk   The patient's ADDITIONAL RISK FACTORS and lack of  "PROTECTIVE FACTORS may increase their overall suicide risk ratings.     Additional Risk Factors:    No additional risk factors   Protective Factors:    Having people in his/her life that would prevent the patient from considering a suicide attempt (i.e. young children, spouse, parents, etc.)     An absence of chronic health problems or stable and well treated chronic health issues     A positive relationship with his/her clinical medical and/or mental health providers     Having easy access to supportive family members     Risk Status   Past month: 0. - Very Low Risk:  Evaluation Counselors:  Document in Epic / [employer] to counselor \"Very Low Risk\".      Treatment Counselors:  Reassess upon admission as applicable, assess weekly in progress notes under Dimension 3 and summarize in Discharge / Treatment summary under Dimension 3.    Past 24 hours: 0. - Very Low Risk:  Evaluation Counselors:  Document in Epic / [employer] to counselor \"Very Low Risk\".      Treatment Counselors:  Reassess upon admission as applicable, assess weekly in progress notes under Dimension 3 and summarize in Discharge / Treatment summary under Dimension 3.   Additional information to support suicide risk rating: There was no additional information to provide at this time.     Mental Health Status   Physical Appearance/Attire: Comment: telephone visit, could not assess.   Hygiene: Comment: telephone visit, could not assess.   Eye Contact: comment: telephone visit, could not assess.   Speech Rate:  regular   Speech Volume: regular   Speech Quality: fluid   Cognitive/Perceptual:  reality based   Cognition: memory intact    Judgment: intact   Insight: intact   Orientation:  time, place, person and situation   Thought: logical    Hallucinations:  none   General Behavioral Tone: cooperative   Psychomotor Activity: telephone visit, could not assess.   Gait:  telephone visit, could not assess.   Mood: normal   Affect: telephone visit, could not assess. "   Counselor Notes: NA     Criteria for Diagnosis: DSM-5 Criteria for Substance Use Disorders      Alcohol Use Disorder Severe - 303.90 (F10.20)    Level of Care   I.) Intoxication and Withdrawal: 0   II.) Biomedical:  0   III.) Emotional and Behavioral:  2   IV.) Readiness to Change:  0   V.) Relapse Potential: 2   VI.) Recovery Environmental: 2     Initial Problem List     The patient has poor coping skills  The patient lacks a sober peer support network  The patient has dual issues of MI and CD    Patient/Client is willing to follow treatment recommendations.  Yes    Counselor: COLIN Pichardo

## 2020-08-20 NOTE — TELEPHONE ENCOUNTER
Left voicemail for Client stating plan to invite her to group one more time and if she does not contact staff or attend to stop inviting her and remove her from group

## 2020-08-21 ASSESSMENT — ANXIETY QUESTIONNAIRES: GAD7 TOTAL SCORE: 1

## 2020-08-24 ENCOUNTER — TELEPHONE (OUTPATIENT)
Dept: BEHAVIORAL HEALTH | Facility: CLINIC | Age: 55
End: 2020-08-24

## 2020-08-24 NOTE — TELEPHONE ENCOUNTER
The program received a message from another [facility] department today stating Pt was considering a JAREK program.  Writer called Pt to verify so we would discharge.  She did not answer.  A vm message was left asking for a return call to verify.

## 2021-01-09 ENCOUNTER — HEALTH MAINTENANCE LETTER (OUTPATIENT)
Age: 56
End: 2021-01-09

## 2021-10-23 ENCOUNTER — HEALTH MAINTENANCE LETTER (OUTPATIENT)
Age: 56
End: 2021-10-23

## 2022-02-12 ENCOUNTER — HEALTH MAINTENANCE LETTER (OUTPATIENT)
Age: 57
End: 2022-02-12

## 2022-10-09 ENCOUNTER — HEALTH MAINTENANCE LETTER (OUTPATIENT)
Age: 57
End: 2022-10-09

## 2023-02-12 ENCOUNTER — HEALTH MAINTENANCE LETTER (OUTPATIENT)
Age: 58
End: 2023-02-12

## 2023-03-25 ENCOUNTER — HEALTH MAINTENANCE LETTER (OUTPATIENT)
Age: 58
End: 2023-03-25
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
## 2023-07-11 ENCOUNTER — HOSPITAL ENCOUNTER (INPATIENT)
Facility: CLINIC | Age: 58
LOS: 3 days | Discharge: HOME | End: 2023-07-15
Attending: FAMILY MEDICINE | Admitting: INTERNAL MEDICINE
Payer: COMMERCIAL

## 2023-07-11 DIAGNOSIS — F10.20 ALCOHOL USE DISORDER, SEVERE, DEPENDENCE (H): Primary | ICD-10-CM

## 2023-07-11 DIAGNOSIS — F10.229 ALCOHOL DEPENDENCE WITH INTOXICATION WITH COMPLICATION (H): ICD-10-CM

## 2023-07-11 DIAGNOSIS — E87.20 ACIDOSIS: ICD-10-CM

## 2023-07-11 DIAGNOSIS — F43.23 ADJUSTMENT DISORDER WITH MIXED ANXIETY AND DEPRESSED MOOD: ICD-10-CM

## 2023-07-11 LAB
BASOPHILS # BLD MANUAL: 0 10E3/UL (ref 0–0.2)
BASOPHILS NFR BLD MANUAL: 0 %
EOSINOPHIL # BLD MANUAL: 0 10E3/UL (ref 0–0.7)
EOSINOPHIL NFR BLD MANUAL: 0 %
ERYTHROCYTE [DISTWIDTH] IN BLOOD BY AUTOMATED COUNT: 13.6 % (ref 10–15)
ETHANOL SERPL-MCNC: 0.27 G/DL
HCT VFR BLD AUTO: 47.5 % (ref 35–47)
HGB BLD-MCNC: 15.5 G/DL (ref 11.7–15.7)
LIPASE SERPL-CCNC: 64 U/L (ref 13–60)
LYMPHOCYTES # BLD MANUAL: 0.3 10E3/UL (ref 0.8–5.3)
LYMPHOCYTES NFR BLD MANUAL: 3 %
MCH RBC QN AUTO: 30.8 PG (ref 26.5–33)
MCHC RBC AUTO-ENTMCNC: 32.6 G/DL (ref 31.5–36.5)
MCV RBC AUTO: 94 FL (ref 78–100)
METAMYELOCYTES # BLD MANUAL: 0.5 10E3/UL
METAMYELOCYTES NFR BLD MANUAL: 4 %
MONOCYTES # BLD MANUAL: 0.3 10E3/UL (ref 0–1.3)
MONOCYTES NFR BLD MANUAL: 3 %
MYELOCYTES # BLD MANUAL: 0.2 10E3/UL
MYELOCYTES NFR BLD MANUAL: 2 %
NEUTROPHILS # BLD MANUAL: 10.2 10E3/UL (ref 1.6–8.3)
NEUTROPHILS NFR BLD MANUAL: 88 %
PLAT MORPH BLD: ABNORMAL
PLATELET # BLD AUTO: 331 10E3/UL (ref 150–450)
RBC # BLD AUTO: 5.04 10E6/UL (ref 3.8–5.2)
RBC MORPH BLD: ABNORMAL
WBC # BLD AUTO: 11.6 10E3/UL (ref 4–11)

## 2023-07-11 PROCEDURE — 83690 ASSAY OF LIPASE: CPT | Performed by: NURSE PRACTITIONER

## 2023-07-11 PROCEDURE — 99285 EMERGENCY DEPT VISIT HI MDM: CPT | Performed by: FAMILY MEDICINE

## 2023-07-11 PROCEDURE — 80053 COMPREHEN METABOLIC PANEL: CPT | Performed by: FAMILY MEDICINE

## 2023-07-11 PROCEDURE — 84439 ASSAY OF FREE THYROXINE: CPT | Performed by: NURSE PRACTITIONER

## 2023-07-11 PROCEDURE — 83735 ASSAY OF MAGNESIUM: CPT | Performed by: FAMILY MEDICINE

## 2023-07-11 PROCEDURE — 84443 ASSAY THYROID STIM HORMONE: CPT | Performed by: NURSE PRACTITIONER

## 2023-07-11 PROCEDURE — 96361 HYDRATE IV INFUSION ADD-ON: CPT | Performed by: FAMILY MEDICINE

## 2023-07-11 PROCEDURE — 85007 BL SMEAR W/DIFF WBC COUNT: CPT | Performed by: FAMILY MEDICINE

## 2023-07-11 PROCEDURE — 84145 PROCALCITONIN (PCT): CPT | Performed by: NURSE PRACTITIONER

## 2023-07-11 PROCEDURE — 258N000003 HC RX IP 258 OP 636: Performed by: FAMILY MEDICINE

## 2023-07-11 PROCEDURE — 99285 EMERGENCY DEPT VISIT HI MDM: CPT | Mod: 25 | Performed by: FAMILY MEDICINE

## 2023-07-11 PROCEDURE — 85014 HEMATOCRIT: CPT | Performed by: FAMILY MEDICINE

## 2023-07-11 PROCEDURE — 96374 THER/PROPH/DIAG INJ IV PUSH: CPT | Performed by: FAMILY MEDICINE

## 2023-07-11 PROCEDURE — 250N000011 HC RX IP 250 OP 636: Mod: JZ | Performed by: FAMILY MEDICINE

## 2023-07-11 PROCEDURE — 36415 COLL VENOUS BLD VENIPUNCTURE: CPT | Performed by: FAMILY MEDICINE

## 2023-07-11 PROCEDURE — 82077 ASSAY SPEC XCP UR&BREATH IA: CPT | Performed by: FAMILY MEDICINE

## 2023-07-11 PROCEDURE — 80143 DRUG ASSAY ACETAMINOPHEN: CPT | Performed by: FAMILY MEDICINE

## 2023-07-11 PROCEDURE — 83690 ASSAY OF LIPASE: CPT | Performed by: FAMILY MEDICINE

## 2023-07-11 PROCEDURE — 80179 DRUG ASSAY SALICYLATE: CPT | Performed by: FAMILY MEDICINE

## 2023-07-11 PROCEDURE — 82010 KETONE BODYS QUAN: CPT | Performed by: FAMILY MEDICINE

## 2023-07-11 PROCEDURE — 84100 ASSAY OF PHOSPHORUS: CPT | Performed by: FAMILY MEDICINE

## 2023-07-11 RX ORDER — ATENOLOL 50 MG/1
50 TABLET ORAL DAILY PRN
Status: DISCONTINUED | OUTPATIENT
Start: 2023-07-11 | End: 2023-07-15 | Stop reason: HOSPADM

## 2023-07-11 RX ORDER — FOLIC ACID 1 MG/1
1 TABLET ORAL DAILY
Status: DISCONTINUED | OUTPATIENT
Start: 2023-07-12 | End: 2023-07-12

## 2023-07-11 RX ORDER — DIAZEPAM 5 MG
5-20 TABLET ORAL EVERY 30 MIN PRN
Status: DISCONTINUED | OUTPATIENT
Start: 2023-07-11 | End: 2023-07-12

## 2023-07-11 RX ORDER — ONDANSETRON 2 MG/ML
4 INJECTION INTRAMUSCULAR; INTRAVENOUS ONCE
Status: COMPLETED | OUTPATIENT
Start: 2023-07-11 | End: 2023-07-11

## 2023-07-11 RX ORDER — MULTIPLE VITAMINS W/ MINERALS TAB 9MG-400MCG
1 TAB ORAL DAILY
Status: DISCONTINUED | OUTPATIENT
Start: 2023-07-12 | End: 2023-07-12

## 2023-07-11 RX ADMIN — SODIUM CHLORIDE 1000 ML: 9 INJECTION, SOLUTION INTRAVENOUS at 23:48

## 2023-07-11 RX ADMIN — ONDANSETRON 4 MG: 2 INJECTION INTRAMUSCULAR; INTRAVENOUS at 23:42

## 2023-07-11 ASSESSMENT — ACTIVITIES OF DAILY LIVING (ADL)
ADLS_ACUITY_SCORE: 35
ADLS_ACUITY_SCORE: 35

## 2023-07-12 PROBLEM — E87.20 ACIDOSIS: Status: ACTIVE | Noted: 2023-07-12

## 2023-07-12 PROBLEM — F10.229 ALCOHOL DEPENDENCE WITH INTOXICATION WITH COMPLICATION (H): Status: ACTIVE | Noted: 2023-07-12

## 2023-07-12 LAB
ALBUMIN SERPL BCG-MCNC: 3 G/DL (ref 3.5–5.2)
ALBUMIN SERPL BCG-MCNC: 3.3 G/DL (ref 3.5–5.2)
ALBUMIN SERPL BCG-MCNC: 4.3 G/DL (ref 3.5–5.2)
ALBUMIN UR-MCNC: 50 MG/DL
ALP SERPL-CCNC: 58 U/L (ref 35–104)
ALP SERPL-CCNC: 64 U/L (ref 35–104)
ALP SERPL-CCNC: 93 U/L (ref 35–104)
ALT SERPL W P-5'-P-CCNC: 39 U/L (ref 0–50)
ALT SERPL W P-5'-P-CCNC: 43 U/L (ref 0–50)
ALT SERPL W P-5'-P-CCNC: 59 U/L (ref 0–50)
AMMONIA PLAS-SCNC: 26 UMOL/L (ref 11–51)
ANION GAP SERPL CALCULATED.3IONS-SCNC: 11 MMOL/L (ref 7–15)
ANION GAP SERPL CALCULATED.3IONS-SCNC: 17 MMOL/L (ref 7–15)
ANION GAP SERPL CALCULATED.3IONS-SCNC: 18 MMOL/L (ref 7–15)
ANION GAP SERPL CALCULATED.3IONS-SCNC: 30 MMOL/L (ref 7–15)
ANION GAP SERPL CALCULATED.3IONS-SCNC: 8 MMOL/L (ref 7–15)
APAP SERPL-MCNC: <5 UG/ML (ref 10–30)
APAP SERPL-MCNC: <5 UG/ML (ref 10–30)
APPEARANCE UR: ABNORMAL
AST SERPL W P-5'-P-CCNC: 42 U/L (ref 0–45)
AST SERPL W P-5'-P-CCNC: 44 U/L (ref 0–45)
AST SERPL W P-5'-P-CCNC: 67 U/L (ref 0–45)
ATRIAL RATE - MUSE: 109 BPM
ATRIAL RATE - MUSE: 92 BPM
B-OH-BUTYR SERPL-SCNC: 3.4 MMOL/L
B-OH-BUTYR SERPL-SCNC: 8.1 MMOL/L
B-OH-BUTYR SERPL-SCNC: <0.18 MMOL/L
BASE EXCESS BLDV CALC-SCNC: -14.4 MMOL/L (ref -7.7–1.9)
BASE EXCESS BLDV CALC-SCNC: -15.2 MMOL/L (ref -7.7–1.9)
BASE EXCESS BLDV CALC-SCNC: -2.7 MMOL/L (ref -7.7–1.9)
BASE EXCESS BLDV CALC-SCNC: -7.1 MMOL/L (ref -7.7–1.9)
BILIRUB SERPL-MCNC: 0.2 MG/DL
BILIRUB SERPL-MCNC: 0.2 MG/DL
BILIRUB SERPL-MCNC: 0.3 MG/DL
BILIRUB UR QL STRIP: NEGATIVE
BUN SERPL-MCNC: 15.4 MG/DL (ref 6–20)
BUN SERPL-MCNC: 16.2 MG/DL (ref 6–20)
BUN SERPL-MCNC: 16.3 MG/DL (ref 6–20)
BUN SERPL-MCNC: 16.6 MG/DL (ref 6–20)
BUN SERPL-MCNC: 21.1 MG/DL (ref 6–20)
CA-I BLD-MCNC: 3.7 MG/DL (ref 4.4–5.2)
CA-I BLD-MCNC: 4.2 MG/DL (ref 4.4–5.2)
CA-I BLD-MCNC: 4.6 MG/DL (ref 4.4–5.2)
CALCIUM SERPL-MCNC: 5.7 MG/DL (ref 8.6–10)
CALCIUM SERPL-MCNC: 6.6 MG/DL (ref 8.6–10)
CALCIUM SERPL-MCNC: 7.8 MG/DL (ref 8.6–10)
CALCIUM SERPL-MCNC: 7.8 MG/DL (ref 8.6–10)
CALCIUM SERPL-MCNC: 8.3 MG/DL (ref 8.6–10)
CHLORIDE SERPL-SCNC: 105 MMOL/L (ref 98–107)
CHLORIDE SERPL-SCNC: 107 MMOL/L (ref 98–107)
CHLORIDE SERPL-SCNC: 109 MMOL/L (ref 98–107)
CHLORIDE SERPL-SCNC: 110 MMOL/L (ref 98–107)
CHLORIDE SERPL-SCNC: 96 MMOL/L (ref 98–107)
COLOR UR AUTO: YELLOW
CREAT SERPL-MCNC: 0.58 MG/DL (ref 0.51–0.95)
CREAT SERPL-MCNC: 0.68 MG/DL (ref 0.51–0.95)
CREAT SERPL-MCNC: 0.75 MG/DL (ref 0.51–0.95)
CREAT SERPL-MCNC: 0.78 MG/DL (ref 0.51–0.95)
CREAT SERPL-MCNC: 0.78 MG/DL (ref 0.51–0.95)
CREAT SERPL-MCNC: 0.79 MG/DL (ref 0.51–0.95)
DEPRECATED HCO3 PLAS-SCNC: 10 MMOL/L (ref 22–29)
DEPRECATED HCO3 PLAS-SCNC: 16 MMOL/L (ref 22–29)
DEPRECATED HCO3 PLAS-SCNC: 19 MMOL/L (ref 22–29)
DEPRECATED HCO3 PLAS-SCNC: 7 MMOL/L (ref 22–29)
DEPRECATED HCO3 PLAS-SCNC: 9 MMOL/L (ref 22–29)
DIASTOLIC BLOOD PRESSURE - MUSE: NORMAL MMHG
DIASTOLIC BLOOD PRESSURE - MUSE: NORMAL MMHG
ERYTHROCYTE [DISTWIDTH] IN BLOOD BY AUTOMATED COUNT: 13.8 % (ref 10–15)
ERYTHROCYTE [DISTWIDTH] IN BLOOD BY AUTOMATED COUNT: 14 % (ref 10–15)
GFR SERPL CREATININE-BSD FRML MDRD: 86 ML/MIN/1.73M2
GFR SERPL CREATININE-BSD FRML MDRD: 88 ML/MIN/1.73M2
GFR SERPL CREATININE-BSD FRML MDRD: 88 ML/MIN/1.73M2
GFR SERPL CREATININE-BSD FRML MDRD: >90 ML/MIN/1.73M2
GLUCOSE BLDC GLUCOMTR-MCNC: 141 MG/DL (ref 70–99)
GLUCOSE BLDC GLUCOMTR-MCNC: 161 MG/DL (ref 70–99)
GLUCOSE BLDC GLUCOMTR-MCNC: 191 MG/DL (ref 70–99)
GLUCOSE BLDC GLUCOMTR-MCNC: 86 MG/DL (ref 70–99)
GLUCOSE SERPL-MCNC: 137 MG/DL (ref 70–99)
GLUCOSE SERPL-MCNC: 244 MG/DL (ref 70–99)
GLUCOSE SERPL-MCNC: 248 MG/DL (ref 70–99)
GLUCOSE SERPL-MCNC: 57 MG/DL (ref 70–99)
GLUCOSE SERPL-MCNC: 743 MG/DL (ref 70–99)
GLUCOSE UR STRIP-MCNC: 200 MG/DL
HCO3 BLDV-SCNC: 12 MMOL/L (ref 21–28)
HCO3 BLDV-SCNC: 12 MMOL/L (ref 21–28)
HCO3 BLDV-SCNC: 17 MMOL/L (ref 21–28)
HCO3 BLDV-SCNC: 21 MMOL/L (ref 21–28)
HCT VFR BLD AUTO: 36.2 % (ref 35–47)
HCT VFR BLD AUTO: 36.6 % (ref 35–47)
HGB BLD-MCNC: 11.3 G/DL (ref 11.7–15.7)
HGB BLD-MCNC: 11.8 G/DL (ref 11.7–15.7)
HGB UR QL STRIP: ABNORMAL
HOLD SPECIMEN: NORMAL
INR PPP: 1.1 (ref 0.85–1.15)
INTERPRETATION ECG - MUSE: NORMAL
INTERPRETATION ECG - MUSE: NORMAL
KETONES UR STRIP-MCNC: 20 MG/DL
LACTATE SERPL-SCNC: 1.5 MMOL/L (ref 0.7–2)
LACTATE SERPL-SCNC: 2.1 MMOL/L (ref 0.7–2)
LACTATE SERPL-SCNC: 2.7 MMOL/L (ref 0.7–2)
LACTATE SERPL-SCNC: 2.7 MMOL/L (ref 0.7–2)
LACTATE SERPL-SCNC: 2.8 MMOL/L (ref 0.7–2)
LEUKOCYTE ESTERASE UR QL STRIP: ABNORMAL
LIPASE SERPL-CCNC: 62 U/L (ref 13–60)
MAGNESIUM SERPL-MCNC: 1.5 MG/DL (ref 1.7–2.3)
MAGNESIUM SERPL-MCNC: 1.8 MG/DL (ref 1.7–2.3)
MAGNESIUM SERPL-MCNC: 2.1 MG/DL (ref 1.7–2.3)
MCH RBC QN AUTO: 30.8 PG (ref 26.5–33)
MCH RBC QN AUTO: 30.9 PG (ref 26.5–33)
MCHC RBC AUTO-ENTMCNC: 30.9 G/DL (ref 31.5–36.5)
MCHC RBC AUTO-ENTMCNC: 32.6 G/DL (ref 31.5–36.5)
MCV RBC AUTO: 100 FL (ref 78–100)
MCV RBC AUTO: 95 FL (ref 78–100)
NITRATE UR QL: NEGATIVE
O2/TOTAL GAS SETTING VFR VENT: 0 %
O2/TOTAL GAS SETTING VFR VENT: 20 %
O2/TOTAL GAS SETTING VFR VENT: 21 %
O2/TOTAL GAS SETTING VFR VENT: 21 %
OSMOLALITY SERPL: 309 MMOL/KG (ref 275–295)
OSMOLALITY SERPL: 348 MMOL/KG (ref 275–295)
OSMOLALITY SERPL: 355 MMOL/KG (ref 275–295)
OXYHGB MFR BLDV: 64 % (ref 70–75)
P AXIS - MUSE: 59 DEGREES
P AXIS - MUSE: 62 DEGREES
PCO2 BLDV: 27 MM HG (ref 40–50)
PCO2 BLDV: 30 MM HG (ref 40–50)
PCO2 BLDV: 31 MM HG (ref 40–50)
PCO2 BLDV: 33 MM HG (ref 40–50)
PH BLDV: 7.2 [PH] (ref 7.32–7.43)
PH BLDV: 7.24 [PH] (ref 7.32–7.43)
PH BLDV: 7.35 [PH] (ref 7.32–7.43)
PH BLDV: 7.41 [PH] (ref 7.32–7.43)
PH UR STRIP: 6 [PH] (ref 5–7)
PHOSPHATE SERPL-MCNC: 2 MG/DL (ref 2.5–4.5)
PHOSPHATE SERPL-MCNC: 2 MG/DL (ref 2.5–4.5)
PHOSPHATE SERPL-MCNC: 3.8 MG/DL (ref 2.5–4.5)
PLATELET # BLD AUTO: 222 10E3/UL (ref 150–450)
PLATELET # BLD AUTO: 227 10E3/UL (ref 150–450)
PO2 BLDV: 43 MM HG (ref 25–47)
PO2 BLDV: 51 MM HG (ref 25–47)
PO2 BLDV: 54 MM HG (ref 25–47)
PO2 BLDV: 57 MM HG (ref 25–47)
POTASSIUM SERPL-SCNC: 3.7 MMOL/L (ref 3.4–5.3)
POTASSIUM SERPL-SCNC: 4.2 MMOL/L (ref 3.4–5.3)
POTASSIUM SERPL-SCNC: 4.4 MMOL/L (ref 3.4–5.3)
POTASSIUM SERPL-SCNC: 4.4 MMOL/L (ref 3.4–5.3)
POTASSIUM SERPL-SCNC: 4.7 MMOL/L (ref 3.4–5.3)
PR INTERVAL - MUSE: 154 MS
PR INTERVAL - MUSE: 170 MS
PROCALCITONIN SERPL IA-MCNC: 0.11 NG/ML
PROT SERPL-MCNC: 4.4 G/DL (ref 6.4–8.3)
PROT SERPL-MCNC: 4.8 G/DL (ref 6.4–8.3)
PROT SERPL-MCNC: 6.7 G/DL (ref 6.4–8.3)
QRS DURATION - MUSE: 88 MS
QRS DURATION - MUSE: 90 MS
QT - MUSE: 340 MS
QT - MUSE: 372 MS
QTC - MUSE: 457 MS
QTC - MUSE: 460 MS
R AXIS - MUSE: 71 DEGREES
R AXIS - MUSE: 72 DEGREES
RBC # BLD AUTO: 3.67 10E6/UL (ref 3.8–5.2)
RBC # BLD AUTO: 3.82 10E6/UL (ref 3.8–5.2)
RBC URINE: 6 /HPF
SALICYLATES SERPL-MCNC: <0.3 MG/DL
SALICYLATES SERPL-MCNC: <0.3 MG/DL
SODIUM SERPL-SCNC: 132 MMOL/L (ref 136–145)
SODIUM SERPL-SCNC: 133 MMOL/L (ref 136–145)
SODIUM SERPL-SCNC: 134 MMOL/L (ref 136–145)
SODIUM SERPL-SCNC: 136 MMOL/L (ref 136–145)
SODIUM SERPL-SCNC: 137 MMOL/L (ref 136–145)
SP GR UR STRIP: 1.02 (ref 1–1.03)
SQUAMOUS EPITHELIAL: 5 /HPF
SYSTOLIC BLOOD PRESSURE - MUSE: NORMAL MMHG
SYSTOLIC BLOOD PRESSURE - MUSE: NORMAL MMHG
T AXIS - MUSE: 65 DEGREES
T AXIS - MUSE: 69 DEGREES
T4 FREE SERPL-MCNC: 1.13 NG/DL (ref 0.9–1.7)
TRANSITIONAL EPI: <1 /HPF
TSH SERPL DL<=0.005 MIU/L-ACNC: 0.18 UIU/ML (ref 0.3–4.2)
UROBILINOGEN UR STRIP-MCNC: NORMAL MG/DL
VENTRICULAR RATE- MUSE: 109 BPM
VENTRICULAR RATE- MUSE: 92 BPM
WBC # BLD AUTO: 10.4 10E3/UL (ref 4–11)
WBC # BLD AUTO: 9.7 10E3/UL (ref 4–11)
WBC URINE: 16 /HPF

## 2023-07-12 PROCEDURE — 82010 KETONE BODYS QUAN: CPT | Performed by: NURSE PRACTITIONER

## 2023-07-12 PROCEDURE — 82330 ASSAY OF CALCIUM: CPT | Performed by: NURSE PRACTITIONER

## 2023-07-12 PROCEDURE — 258N000003 HC RX IP 258 OP 636: Performed by: EMERGENCY MEDICINE

## 2023-07-12 PROCEDURE — 83930 ASSAY OF BLOOD OSMOLALITY: CPT | Performed by: NURSE PRACTITIONER

## 2023-07-12 PROCEDURE — 93010 ELECTROCARDIOGRAM REPORT: CPT | Performed by: EMERGENCY MEDICINE

## 2023-07-12 PROCEDURE — 250N000009 HC RX 250: Performed by: EMERGENCY MEDICINE

## 2023-07-12 PROCEDURE — 83605 ASSAY OF LACTIC ACID: CPT

## 2023-07-12 PROCEDURE — 90791 PSYCH DIAGNOSTIC EVALUATION: CPT

## 2023-07-12 PROCEDURE — 258N000003 HC RX IP 258 OP 636

## 2023-07-12 PROCEDURE — 80048 BASIC METABOLIC PNL TOTAL CA: CPT

## 2023-07-12 PROCEDURE — 83605 ASSAY OF LACTIC ACID: CPT | Performed by: NURSE PRACTITIONER

## 2023-07-12 PROCEDURE — 250N000013 HC RX MED GY IP 250 OP 250 PS 637: Performed by: NURSE PRACTITIONER

## 2023-07-12 PROCEDURE — 258N000003 HC RX IP 258 OP 636: Performed by: NURSE PRACTITIONER

## 2023-07-12 PROCEDURE — 83930 ASSAY OF BLOOD OSMOLALITY: CPT | Performed by: FAMILY MEDICINE

## 2023-07-12 PROCEDURE — 80048 BASIC METABOLIC PNL TOTAL CA: CPT | Performed by: NURSE PRACTITIONER

## 2023-07-12 PROCEDURE — 82805 BLOOD GASES W/O2 SATURATION: CPT | Performed by: NURSE PRACTITIONER

## 2023-07-12 PROCEDURE — 82803 BLOOD GASES ANY COMBINATION: CPT | Performed by: NURSE PRACTITIONER

## 2023-07-12 PROCEDURE — 85027 COMPLETE CBC AUTOMATED: CPT | Performed by: NURSE PRACTITIONER

## 2023-07-12 PROCEDURE — 250N000011 HC RX IP 250 OP 636: Performed by: NURSE PRACTITIONER

## 2023-07-12 PROCEDURE — 80179 DRUG ASSAY SALICYLATE: CPT | Performed by: NURSE PRACTITIONER

## 2023-07-12 PROCEDURE — 36415 COLL VENOUS BLD VENIPUNCTURE: CPT | Performed by: FAMILY MEDICINE

## 2023-07-12 PROCEDURE — 96375 TX/PRO/DX INJ NEW DRUG ADDON: CPT | Performed by: FAMILY MEDICINE

## 2023-07-12 PROCEDURE — 85610 PROTHROMBIN TIME: CPT | Performed by: NURSE PRACTITIONER

## 2023-07-12 PROCEDURE — 36415 COLL VENOUS BLD VENIPUNCTURE: CPT | Performed by: NURSE PRACTITIONER

## 2023-07-12 PROCEDURE — 36415 COLL VENOUS BLD VENIPUNCTURE: CPT

## 2023-07-12 PROCEDURE — 82962 GLUCOSE BLOOD TEST: CPT

## 2023-07-12 PROCEDURE — 82803 BLOOD GASES ANY COMBINATION: CPT

## 2023-07-12 PROCEDURE — 258N000001 HC RX 258: Performed by: FAMILY MEDICINE

## 2023-07-12 PROCEDURE — 84155 ASSAY OF PROTEIN SERUM: CPT | Performed by: NURSE PRACTITIONER

## 2023-07-12 PROCEDURE — 250N000011 HC RX IP 250 OP 636: Performed by: EMERGENCY MEDICINE

## 2023-07-12 PROCEDURE — 250N000013 HC RX MED GY IP 250 OP 250 PS 637: Performed by: INTERNAL MEDICINE

## 2023-07-12 PROCEDURE — 83605 ASSAY OF LACTIC ACID: CPT | Performed by: FAMILY MEDICINE

## 2023-07-12 PROCEDURE — 99254 IP/OBS CNSLTJ NEW/EST MOD 60: CPT

## 2023-07-12 PROCEDURE — HZ2ZZZZ DETOXIFICATION SERVICES FOR SUBSTANCE ABUSE TREATMENT: ICD-10-PCS | Performed by: NURSE PRACTITIONER

## 2023-07-12 PROCEDURE — 87086 URINE CULTURE/COLONY COUNT: CPT | Performed by: FAMILY MEDICINE

## 2023-07-12 PROCEDURE — 250N000013 HC RX MED GY IP 250 OP 250 PS 637

## 2023-07-12 PROCEDURE — 83735 ASSAY OF MAGNESIUM: CPT | Performed by: NURSE PRACTITIONER

## 2023-07-12 PROCEDURE — 84100 ASSAY OF PHOSPHORUS: CPT | Performed by: NURSE PRACTITIONER

## 2023-07-12 PROCEDURE — 81001 URINALYSIS AUTO W/SCOPE: CPT | Performed by: FAMILY MEDICINE

## 2023-07-12 PROCEDURE — 80143 DRUG ASSAY ACETAMINOPHEN: CPT | Performed by: NURSE PRACTITIONER

## 2023-07-12 PROCEDURE — 200N000002 HC R&B ICU UMMC

## 2023-07-12 PROCEDURE — 99223 1ST HOSP IP/OBS HIGH 75: CPT | Performed by: NURSE PRACTITIONER

## 2023-07-12 PROCEDURE — 82140 ASSAY OF AMMONIA: CPT | Performed by: NURSE PRACTITIONER

## 2023-07-12 PROCEDURE — 250N000013 HC RX MED GY IP 250 OP 250 PS 637: Performed by: FAMILY MEDICINE

## 2023-07-12 PROCEDURE — 258N000003 HC RX IP 258 OP 636: Performed by: FAMILY MEDICINE

## 2023-07-12 PROCEDURE — 82010 KETONE BODYS QUAN: CPT

## 2023-07-12 PROCEDURE — 93005 ELECTROCARDIOGRAM TRACING: CPT

## 2023-07-12 RX ORDER — LAMOTRIGINE 100 MG/1
100 TABLET ORAL DAILY
Status: ON HOLD | COMMUNITY
End: 2023-07-15

## 2023-07-12 RX ORDER — NICOTINE POLACRILEX 4 MG
15-30 LOZENGE BUCCAL
Status: DISCONTINUED | OUTPATIENT
Start: 2023-07-12 | End: 2023-07-15 | Stop reason: HOSPADM

## 2023-07-12 RX ORDER — TRAZODONE HYDROCHLORIDE 50 MG/1
50 TABLET, FILM COATED ORAL
Status: DISCONTINUED | OUTPATIENT
Start: 2023-07-12 | End: 2023-07-12

## 2023-07-12 RX ORDER — LAMOTRIGINE 100 MG/1
100 TABLET ORAL AT BEDTIME
Status: DISCONTINUED | OUTPATIENT
Start: 2023-07-12 | End: 2023-07-15 | Stop reason: HOSPADM

## 2023-07-12 RX ORDER — GABAPENTIN 300 MG/1
900 CAPSULE ORAL EVERY 8 HOURS
Status: COMPLETED | OUTPATIENT
Start: 2023-07-12 | End: 2023-07-15

## 2023-07-12 RX ORDER — SODIUM CHLORIDE 9 MG/ML
INJECTION, SOLUTION INTRAVENOUS CONTINUOUS
Status: DISCONTINUED | OUTPATIENT
Start: 2023-07-12 | End: 2023-07-12

## 2023-07-12 RX ORDER — ACETAMINOPHEN 325 MG/10.15ML
650 LIQUID ORAL EVERY 4 HOURS PRN
Status: DISCONTINUED | OUTPATIENT
Start: 2023-07-12 | End: 2023-07-15 | Stop reason: HOSPADM

## 2023-07-12 RX ORDER — BUPROPION HYDROCHLORIDE 150 MG/1
150 TABLET ORAL EVERY MORNING
Status: DISCONTINUED | OUTPATIENT
Start: 2023-07-12 | End: 2023-07-12

## 2023-07-12 RX ORDER — DEXTROSE MONOHYDRATE 25 G/50ML
25-50 INJECTION, SOLUTION INTRAVENOUS
Status: DISCONTINUED | OUTPATIENT
Start: 2023-07-12 | End: 2023-07-15 | Stop reason: HOSPADM

## 2023-07-12 RX ORDER — MAGNESIUM OXIDE 400 MG/1
400 TABLET ORAL EVERY 4 HOURS
Status: COMPLETED | OUTPATIENT
Start: 2023-07-12 | End: 2023-07-12

## 2023-07-12 RX ORDER — BISACODYL 10 MG
10 SUPPOSITORY, RECTAL RECTAL DAILY PRN
Status: DISCONTINUED | OUTPATIENT
Start: 2023-07-12 | End: 2023-07-15 | Stop reason: HOSPADM

## 2023-07-12 RX ORDER — MULTIPLE VITAMINS W/ MINERALS TAB 9MG-400MCG
1 TAB ORAL DAILY
Status: DISCONTINUED | OUTPATIENT
Start: 2023-07-12 | End: 2023-07-15 | Stop reason: HOSPADM

## 2023-07-12 RX ORDER — FOLIC ACID 1 MG/1
1 TABLET ORAL DAILY
Status: DISCONTINUED | OUTPATIENT
Start: 2023-07-15 | End: 2023-07-15 | Stop reason: HOSPADM

## 2023-07-12 RX ORDER — FOLIC ACID 5 MG/ML
1 INJECTION, SOLUTION INTRAMUSCULAR; INTRAVENOUS; SUBCUTANEOUS ONCE
Status: COMPLETED | OUTPATIENT
Start: 2023-07-12 | End: 2023-07-12

## 2023-07-12 RX ORDER — GABAPENTIN 300 MG/1
300 CAPSULE ORAL EVERY 8 HOURS
Status: DISCONTINUED | OUTPATIENT
Start: 2023-07-17 | End: 2023-07-15 | Stop reason: HOSPADM

## 2023-07-12 RX ORDER — GABAPENTIN 600 MG/1
1200 TABLET ORAL ONCE
Status: COMPLETED | OUTPATIENT
Start: 2023-07-12 | End: 2023-07-12

## 2023-07-12 RX ORDER — HALOPERIDOL 5 MG/ML
2.5-5 INJECTION INTRAMUSCULAR EVERY 4 HOURS PRN
Status: DISCONTINUED | OUTPATIENT
Start: 2023-07-12 | End: 2023-07-15 | Stop reason: HOSPADM

## 2023-07-12 RX ORDER — DIAZEPAM 10 MG
10 TABLET ORAL EVERY 30 MIN PRN
Status: DISCONTINUED | OUTPATIENT
Start: 2023-07-12 | End: 2023-07-14

## 2023-07-12 RX ORDER — ACETAMINOPHEN 325 MG/1
650 TABLET ORAL EVERY 4 HOURS PRN
Status: DISCONTINUED | OUTPATIENT
Start: 2023-07-12 | End: 2023-07-15 | Stop reason: HOSPADM

## 2023-07-12 RX ORDER — AMOXICILLIN 250 MG
1 CAPSULE ORAL 2 TIMES DAILY PRN
Status: DISCONTINUED | OUTPATIENT
Start: 2023-07-12 | End: 2023-07-15 | Stop reason: HOSPADM

## 2023-07-12 RX ORDER — ESCITALOPRAM OXALATE 5 MG/1
5 TABLET ORAL DAILY
Status: DISCONTINUED | OUTPATIENT
Start: 2023-07-13 | End: 2023-07-15 | Stop reason: HOSPADM

## 2023-07-12 RX ORDER — GABAPENTIN 300 MG/1
600 CAPSULE ORAL EVERY 8 HOURS
Status: DISCONTINUED | OUTPATIENT
Start: 2023-07-15 | End: 2023-07-15 | Stop reason: HOSPADM

## 2023-07-12 RX ORDER — CLONIDINE HYDROCHLORIDE 0.1 MG/1
0.1 TABLET ORAL EVERY 8 HOURS
Status: DISCONTINUED | OUTPATIENT
Start: 2023-07-12 | End: 2023-07-14

## 2023-07-12 RX ORDER — ESTRADIOL 1 MG/1
1 TABLET ORAL DAILY
COMMUNITY

## 2023-07-12 RX ORDER — ENOXAPARIN SODIUM 100 MG/ML
40 INJECTION SUBCUTANEOUS EVERY 24 HOURS
Status: DISCONTINUED | OUTPATIENT
Start: 2023-07-12 | End: 2023-07-12

## 2023-07-12 RX ORDER — FLUMAZENIL 0.1 MG/ML
0.2 INJECTION, SOLUTION INTRAVENOUS
Status: DISCONTINUED | OUTPATIENT
Start: 2023-07-12 | End: 2023-07-15 | Stop reason: HOSPADM

## 2023-07-12 RX ORDER — TRAZODONE HYDROCHLORIDE 50 MG/1
25 TABLET, FILM COATED ORAL
Status: ON HOLD | COMMUNITY
End: 2023-07-15

## 2023-07-12 RX ORDER — FOLIC ACID 5 MG/ML
1 INJECTION, SOLUTION INTRAMUSCULAR; INTRAVENOUS; SUBCUTANEOUS DAILY
Status: COMPLETED | OUTPATIENT
Start: 2023-07-13 | End: 2023-07-14

## 2023-07-12 RX ORDER — DEXTROSE MONOHYDRATE 25 G/50ML
50 INJECTION, SOLUTION INTRAVENOUS ONCE
Status: COMPLETED | OUTPATIENT
Start: 2023-07-12 | End: 2023-07-12

## 2023-07-12 RX ORDER — DIAZEPAM 10 MG/2ML
5-10 INJECTION, SOLUTION INTRAMUSCULAR; INTRAVENOUS EVERY 30 MIN PRN
Status: DISCONTINUED | OUTPATIENT
Start: 2023-07-12 | End: 2023-07-14

## 2023-07-12 RX ORDER — ONDANSETRON 2 MG/ML
4 INJECTION INTRAMUSCULAR; INTRAVENOUS EVERY 6 HOURS PRN
Status: DISCONTINUED | OUTPATIENT
Start: 2023-07-12 | End: 2023-07-15 | Stop reason: HOSPADM

## 2023-07-12 RX ORDER — CALCIUM GLUCONATE 20 MG/ML
1 INJECTION, SOLUTION INTRAVENOUS ONCE
Status: COMPLETED | OUTPATIENT
Start: 2023-07-12 | End: 2023-07-12

## 2023-07-12 RX ORDER — GABAPENTIN 100 MG/1
100 CAPSULE ORAL EVERY 8 HOURS
Status: DISCONTINUED | OUTPATIENT
Start: 2023-07-19 | End: 2023-07-15 | Stop reason: HOSPADM

## 2023-07-12 RX ORDER — MULTIPLE VITAMINS W/ MINERALS TAB 9MG-400MCG
1 TAB ORAL DAILY
COMMUNITY

## 2023-07-12 RX ORDER — ESTRADIOL 1 MG/1
1 TABLET ORAL DAILY
Status: DISCONTINUED | OUTPATIENT
Start: 2023-07-12 | End: 2023-07-15 | Stop reason: HOSPADM

## 2023-07-12 RX ORDER — MEDROXYPROGESTERONE ACETATE 5 MG
5 TABLET ORAL DAILY
Status: DISCONTINUED | OUTPATIENT
Start: 2023-07-12 | End: 2023-07-15 | Stop reason: HOSPADM

## 2023-07-12 RX ORDER — AMOXICILLIN 250 MG
2 CAPSULE ORAL 2 TIMES DAILY PRN
Status: DISCONTINUED | OUTPATIENT
Start: 2023-07-12 | End: 2023-07-15 | Stop reason: HOSPADM

## 2023-07-12 RX ADMIN — DEXTROSE MONOHYDRATE 50 ML: 25 INJECTION, SOLUTION INTRAVENOUS at 00:25

## 2023-07-12 RX ADMIN — GABAPENTIN 900 MG: 300 CAPSULE ORAL at 22:15

## 2023-07-12 RX ADMIN — DIAZEPAM 10 MG: 10 TABLET ORAL at 12:35

## 2023-07-12 RX ADMIN — FOLIC ACID 1 MG: 5 INJECTION, SOLUTION INTRAMUSCULAR; INTRAVENOUS; SUBCUTANEOUS at 03:30

## 2023-07-12 RX ADMIN — CLONIDINE HYDROCHLORIDE 0.1 MG: 0.1 TABLET ORAL at 22:15

## 2023-07-12 RX ADMIN — MAGNESIUM OXIDE TAB 400 MG (241.3 MG ELEMENTAL MG) 400 MG: 400 (241.3 MG) TAB at 12:25

## 2023-07-12 RX ADMIN — THIAMINE HYDROCHLORIDE 200 MG: 100 INJECTION, SOLUTION INTRAMUSCULAR; INTRAVENOUS at 08:59

## 2023-07-12 RX ADMIN — MEDROXYPROGESTERONE ACETATE 5 MG: 5 TABLET ORAL at 15:12

## 2023-07-12 RX ADMIN — DIAZEPAM 10 MG: 10 TABLET ORAL at 04:09

## 2023-07-12 RX ADMIN — SODIUM CHLORIDE 1000 ML: 9 INJECTION, SOLUTION INTRAVENOUS at 16:37

## 2023-07-12 RX ADMIN — DEXTROSE AND SODIUM CHLORIDE: 5; 900 INJECTION, SOLUTION INTRAVENOUS at 03:30

## 2023-07-12 RX ADMIN — BUPROPION HYDROCHLORIDE 150 MG: 150 TABLET, FILM COATED, EXTENDED RELEASE ORAL at 08:13

## 2023-07-12 RX ADMIN — MULTIPLE VITAMINS W/ MINERALS TAB 1 TABLET: TAB at 08:13

## 2023-07-12 RX ADMIN — POTASSIUM & SODIUM PHOSPHATES POWDER PACK 280-160-250 MG 1 PACKET: 280-160-250 PACK at 08:13

## 2023-07-12 RX ADMIN — CLONIDINE HYDROCHLORIDE 0.1 MG: 0.1 TABLET ORAL at 04:09

## 2023-07-12 RX ADMIN — ESTRADIOL 1 MG: 1 TABLET ORAL at 15:12

## 2023-07-12 RX ADMIN — ATENOLOL 50 MG: 50 TABLET ORAL at 00:21

## 2023-07-12 RX ADMIN — DIAZEPAM 10 MG: 5 TABLET ORAL at 00:21

## 2023-07-12 RX ADMIN — GABAPENTIN 900 MG: 300 CAPSULE ORAL at 12:26

## 2023-07-12 RX ADMIN — DIAZEPAM 10 MG: 10 TABLET ORAL at 08:17

## 2023-07-12 RX ADMIN — FOLIC ACID: 5 INJECTION, SOLUTION INTRAMUSCULAR; INTRAVENOUS; SUBCUTANEOUS at 04:02

## 2023-07-12 RX ADMIN — THIAMINE HYDROCHLORIDE 200 MG: 100 INJECTION, SOLUTION INTRAMUSCULAR; INTRAVENOUS at 15:11

## 2023-07-12 RX ADMIN — SODIUM CHLORIDE 1000 ML: 9 INJECTION, SOLUTION INTRAVENOUS at 01:35

## 2023-07-12 RX ADMIN — CLONIDINE HYDROCHLORIDE 0.1 MG: 0.1 TABLET ORAL at 12:26

## 2023-07-12 RX ADMIN — SODIUM CHLORIDE: 9 INJECTION, SOLUTION INTRAVENOUS at 01:36

## 2023-07-12 RX ADMIN — POTASSIUM & SODIUM PHOSPHATES POWDER PACK 280-160-250 MG 1 PACKET: 280-160-250 PACK at 12:25

## 2023-07-12 RX ADMIN — CALCIUM GLUCONATE 1 G: 20 INJECTION, SOLUTION INTRAVENOUS at 06:44

## 2023-07-12 RX ADMIN — Medication 5 MG: at 22:15

## 2023-07-12 RX ADMIN — GABAPENTIN 1200 MG: 600 TABLET, FILM COATED ORAL at 04:09

## 2023-07-12 RX ADMIN — POTASSIUM & SODIUM PHOSPHATES POWDER PACK 280-160-250 MG 1 PACKET: 280-160-250 PACK at 15:11

## 2023-07-12 RX ADMIN — DIAZEPAM 10 MG: 10 TABLET ORAL at 15:11

## 2023-07-12 RX ADMIN — THIAMINE HYDROCHLORIDE 200 MG: 100 INJECTION, SOLUTION INTRAMUSCULAR; INTRAVENOUS at 22:14

## 2023-07-12 RX ADMIN — MAGNESIUM OXIDE TAB 400 MG (241.3 MG ELEMENTAL MG) 400 MG: 400 (241.3 MG) TAB at 08:13

## 2023-07-12 RX ADMIN — DIAZEPAM 10 MG: 5 TABLET ORAL at 02:11

## 2023-07-12 RX ADMIN — LAMOTRIGINE 100 MG: 100 TABLET ORAL at 22:14

## 2023-07-12 ASSESSMENT — ACTIVITIES OF DAILY LIVING (ADL)
CONCENTRATING,_REMEMBERING_OR_MAKING_DECISIONS_DIFFICULTY: NO
WEAR_GLASSES_OR_BLIND: NO
ADLS_ACUITY_SCORE: 20
ADLS_ACUITY_SCORE: 22
WALKING_OR_CLIMBING_STAIRS_DIFFICULTY: NO
ADLS_ACUITY_SCORE: 22
ADLS_ACUITY_SCORE: 35
TOILETING_ISSUES: NO
DOING_ERRANDS_INDEPENDENTLY_DIFFICULTY: NO
DIFFICULTY_EATING/SWALLOWING: NO
ADLS_ACUITY_SCORE: 22
FALL_HISTORY_WITHIN_LAST_SIX_MONTHS: NO
ADLS_ACUITY_SCORE: 20
ADLS_ACUITY_SCORE: 35
DRESSING/BATHING_DIFFICULTY: NO
ADLS_ACUITY_SCORE: 22
CHANGE_IN_FUNCTIONAL_STATUS_SINCE_ONSET_OF_CURRENT_ILLNESS/INJURY: NO
ADLS_ACUITY_SCORE: 22
ADLS_ACUITY_SCORE: 22

## 2023-07-12 ASSESSMENT — COLUMBIA-SUICIDE SEVERITY RATING SCALE - C-SSRS
TOTAL  NUMBER OF INTERRUPTED ATTEMPTS LIFETIME: NO
SUICIDE, SINCE LAST CONTACT: NO
TOTAL  NUMBER OF INTERRUPTED ATTEMPTS SINCE LAST CONTACT: NO
1. SINCE LAST CONTACT, HAVE YOU WISHED YOU WERE DEAD OR WISHED YOU COULD GO TO SLEEP AND NOT WAKE UP?: NO
2. HAVE YOU ACTUALLY HAD ANY THOUGHTS OF KILLING YOURSELF?: NO
ATTEMPT LIFETIME: NO
1. HAVE YOU WISHED YOU WERE DEAD OR WISHED YOU COULD GO TO SLEEP AND NOT WAKE UP?: NO
2. HAVE YOU ACTUALLY HAD ANY THOUGHTS OF KILLING YOURSELF?: NO
6. HAVE YOU EVER DONE ANYTHING, STARTED TO DO ANYTHING, OR PREPARED TO DO ANYTHING TO END YOUR LIFE?: NO
6. HAVE YOU EVER DONE ANYTHING, STARTED TO DO ANYTHING, OR PREPARED TO DO ANYTHING TO END YOUR LIFE?: NO
TOTAL  NUMBER OF ABORTED OR SELF INTERRUPTED ATTEMPTS LIFETIME: NO
ATTEMPT SINCE LAST CONTACT: NO
TOTAL  NUMBER OF ABORTED OR SELF INTERRUPTED ATTEMPTS SINCE LAST CONTACT: NO

## 2023-07-12 NOTE — UTILIZATION REVIEW
Admission Status; Secondary Review Determination    Under the authority of the Utilization Management Committee, the utilization review process indicated a secondary review on the above patient. The review outcome is based on review of the medical records, discussions with staff, and applying clinical experience noted on the date of the review.    (x) Inpatient Status Appropriate - This patient's medical care is consistent with medical management for inpatient care and reasonable inpatient medical practice.    RATIONALE FOR DETERMINATION: 58-year-old female with history of alcoholism, anxiety, bipolar disorder, who presented to hospital with significant acute alcohol intoxication.  Patient has been sober for a number of years until her relapse in May 2023 due to significant life stressors.  Patient presents with a blood alcohol level 0.27 severe ketoacidosis with a CO2 of 7 ketones 8.1 lactic acid at 2.7 along with mild hyponatremia.  Patient requiring significant acute management of patient's severe acidosis in addition to significant withdrawal management with M SSA scores rising to 26 at midnight requiring multiple doses of 10 mg diazepam follow-up scores fluctuate between 16 and returns back up to double digits scores later that morning with significant persistent acidosis with a bicarb level of 10.  Due to the severity of patient's metabolic abnormalities as well as requiring frequent doses of benzodiazepines with recurrent double-digit withdrawal scores inpatient care and ICU management appropriate.    At the time of admission with the information available to the attending physician more than 2 nights Hospital complex care was anticipated, based on patient risk of adverse outcome if treated as outpatient and complex care required. Inpatient admission is appropriate based on the Medicare guidelines.    This document was produced using voice recognition software    The information on this document is  developed by the utilization review team in order for the business office to ensure compliance. This only denotes the appropriateness of proper admission status and does not reflect the quality of care rendered.    The definitions of Inpatient Status and Observation Status used in making the determination above are those provided in the CMS Coverage Manual, Chapter 1 and Chapter 6, section 70.4.    Sincerely,    Shad Leal MD  Utilization Review  Physician Advisor  [facility].

## 2023-07-12 NOTE — H&P
MEDICAL ICU H&P  07/12/2023     Date of Hospital Admission: 7/12/23   Date of ICU Admission: 7/12/23  Reason for Critical Care Admission: Acute ETOH intoxication and management of ETOH withdrawal  Date of Service (when I saw the patient): 07/12/2023    ASSESSMENT: Kallie Estrada is a 58 year old female with PMH notable for anxiety, depression, ETOH dependence. Presented to [facility] ED 7/11/23 with acute ETOH intoxication seeking detox. Admitted to ICU given metabolic acidosis and VS derangements on arrival concerning for development of overt DTs.      PLAN:    Neuro:  # Acute ETOH intoxication  # ETOH withdrawal  Endorses a binge drinking pattern up to 3 bottles of wine/day, last drink 7/11/23. Presented to ED 7/11 with ETOH 0.27. Seeking detox currently. High risk of complicated withdrawal. No hx of ETOH withdrawal seizures. Has been sober for a number of years in the past and relapsed in May 2023 2/2 various life stressors.  Denies other illicit drug use.    ETOH in ED 0.27, UDS ordered    CIWA protocol    Diazepam PRN    Gabapentin taper    Haloperidol PRN, will obtain EKG on arrival for QTc monitoring    Consider dex gtt to attenuate BZD requirement     Seizure precautions    Thiamine and folate supplements     Denies suicidal ideation    Chem dep consult this AM, pt agreeable to be seen    # Depression  # Anxiety    Resume PTA Wellbutrin    Psychiatry consult     Pulmonary:  No acute concerns    Supplemental O2 to keep SPO2 > 92%    Cardiovascular:  # Tachycardia, likely 2/2 dehydration and withdrawal    Repeat CBC, chem panel, LA     Check TSH    EKG on arrival    VS and tele per ICU routine     S/p 2L IV fluid bolus + MIV @ 125 --> continue MIV    Goal HR < 130, goal MAP > 65 mmHg     GI/Nutrition:  # Elevated transaminases 2/2 ETOH use  # Elevated lipase   # High risk for re-feeding syndrome     Regular diet     Recheck hepatic panel, tylenol, and lipase on arrival    INR to evaluate for liver  dysfunction    PRN bowel regimen     Renal/Fluids/Electrolytes:  # AGMA 2/2 alcoholic ketoacidosis and mild lactic acidosis   # Mild hyponatremia, likely 2/2 low solute intake     Check lactic acid, ketones, and osmolar gap to evaluate for source of metabolic acidosis, diagnostics pending results    VBG and BMP on arrival    Add dextrose to fluids, trend ketones     High intensity electrolyte replacement protocol    Monitor fluid balance with I&O and daily weights, no singh needed    Endocrine:  # Hypoglycemia    Dextrose in fluids, monitor BG q4h    Check TSH     ID:  # Leukocytosis    Unclear etiology    U/UC ordered     Monitor fever curve, consider infectious work-up if patient if fevering    Hematology:    # No acute concerns    Daily CBC    Lovenox and PCDs    Musculoskeletal:  # Risk for deconditioning    Ambulatory PTA, will hold off on PT/OT for now, consider if pt has limited activity during acute phase of detox      Skin:  No acute concerns    Diligent skin cares to prevent breakdown    General Cares/Prophylaxis:    DVT Prophylaxis: Enoxaparin (Lovenox) SQ and Pneumatic Compression Devices  GI Prophylaxis: Not indicated  Restraints: Not indicated   Family Communication: Declined listing emergency contact data, consider SW consult in AM if decision maker required   Code Status: Full    Lines/tubes/drains:  - PIV x1, will need 2nd line of IV access    Disposition:    [facility] adult ICU      Harper Condon NP  Patient care time 45 minutes         -----------------------------------------------------------------------    HISTORY PRESENTING ILLNESS:     Kallie Estrada is a 58 year old female with PMH notable for anxiety, depression, ETOH dependence. Presented to [facility] ED 7/11/23 with acute ETOH intoxication seeking detox. Admitted to ICU given metabolic acidosis and VS derangements on arrival concerning for development of overt DTs.    Endorses a binge drinking pattern up to 3 bottles of wine/day, last  drink 7/11/23. Presented to ED 7/11 with ETOH 0.27. Seeking detox currently. High risk of complicated withdrawal. No hx of ETOH withdrawal seizures. Has been sober for a number of years in the past and relapsed in May 2023 2/2 various life stressors.  Denies other illicit drug use. 10-point ROS positive for HA, otherwise negative. Initial labs notable for Na+ 133, CO2 7, ALT 59, AST 67, ketones 8.1, LA 2.7, lipase 62, osmo 348, ETOH 0.27, BG 57, WBC 11.6. She was given 3L fluid + MIV, diazepam, and dextrose. Labs on ICU arrival likely diluted and are being rechecked. Will continue to tx ETOH withdrawal with CIWA protocol and consult psych and chem dep this AM.    REVIEW OF SYSTEMS: See HPI    PAST MEDICAL HISTORY:   No past medical history on file.  SURGICAL HISTORY:  No past surgical history on file.  SOCIAL HISTORY:  Social History     Socioeconomic History     Marital status:    Tobacco Use     Smoking status: Former     Smokeless tobacco: Never     FAMILY HISTORY:   Family History   Problem Relation Age of Onset     Substance Abuse Paternal Grandfather      Substance Abuse Brother      ALLERGIES:   Allergies   Allergen Reactions     Sulfa Antibiotics Hives     Eggs [Chicken-Derived Products (Egg)]      MEDICATIONS:  No current facility-administered medications on file prior to encounter.  buPROPion (WELLBUTRIN XL) 150 MG 24 hr tablet, Take 150 mg by mouth every morning  diphenhydrAMINE-acetaminophen (TYLENOL PM)  MG tablet, Take 1 tablet by mouth nightly as needed for sleep  melatonin 5 MG tablet, Take 5 mg by mouth nightly as needed for sleep        PHYSICAL EXAMINATION:  Temp:  [98.1  F (36.7  C)-98.3  F (36.8  C)] 98.1  F (36.7  C)  Pulse:  [138] 138  Resp:  [16-20] 20  BP: (152-154)/(80-90) 154/90  SpO2:  [97 %-98 %] 98 %  General: Awake and alert, in NAD  HEENT: normocephalic, atraumatic, oral mucosa moist, neck supple  Neuro: A&Ox3, NAD, motor exam grossly non-focal  Pulm/Resp: Clear breath  sounds bilaterally without rhonchi, crackles or wheeze, breathing non-labored  CV: RRR, warm extremities, no RMG  Abdomen: Soft, non-distended, non-tender  : voiding spontaneously without difficulty   Incisions/Skin: no notable wounds, rashes, or lesions     LABS: Reviewed.   Arterial Blood Gases   No lab results found in last 7 days.  Complete Blood Count   Recent Labs   Lab 07/12/23  0400 07/11/23  2259   WBC 9.7 11.6*   HGB 11.3* 15.5    331     Basic Metabolic Panel  Recent Labs   Lab 07/12/23  0359 07/12/23  0303 07/12/23  0133 07/11/23  2343     --   --  133*   POTASSIUM 3.7  --   --  4.7   CHLORIDE 110*  --   --  96*   CO2 9*  --   --  7*   BUN 15.4  --   --  21.1*   CR 0.58  --   --  0.78  0.78   * 86 161* 57*     Liver Function Tests  Recent Labs   Lab 07/12/23  0359 07/11/23  2343   AST 42 67*   ALT 39 59*   ALKPHOS 58 93   BILITOTAL 0.2 0.2   ALBUMIN 3.0* 4.3   INR 1.10  --      Coagulation Profile  Recent Labs   Lab 07/12/23  0359   INR 1.10       IMAGING:  No results found for this or any previous visit (from the past 24 hour(s)).

## 2023-07-12 NOTE — ED NOTES
Pt is conversational with writer, speaking about daughters and life. Pt remains alert, even chest rise and fall. HR still persisting in 120-130 range despite meds + IVF. Pt currently having ECG done and moving to ICU bed. Pt still ++tearful.

## 2023-07-12 NOTE — PROGRESS NOTES
10A ICU End of Shift Summary.     Changes this shift: Pt admitted via [facility] ED at 0300, initial skin assessment completed by 2 RN, Skin grossly intact    Neuro:A&Ox4, able to make needs known, CIWA- ar score ranges from 5-9, 10 mg diazepam administered x1  Cardiac: NSR, BP WNL  Respiratory:On RA. Sats>92 Lung sounds CTA,    GI/: NPO, Ice chips ok. Voids spontenously  Skin: Grossly intact  LDA's: 1 Lt. LF PIV  Activities: Ambulates to the bathroom without difficulties       Plan:   Continue CIWA-ar trending/neuro assessments/ electrolyte corrections.

## 2023-07-12 NOTE — CONSULTS
"Triage and Transition - Consult and Liaison     Kallie Estrada  July 12, 2023    Session start: 9:35 am  Session end: 10:01  Session duration in minutes: 26  CPT utilized: 89770 - Brief diagnostic assessment (modifier 52)  Patient was seen virtually ([employer] cart or other teleconferencing device).    Diagnosis:   Adjustment Disorders  309.28 (F43.23) With mixed anxiety and depressed mood, present and by hx;  296.89 Bipolar II Disorder ., by history;   Substance-Related & Addictive Disorders Alcohol Use Disorder   303.90 (F10.20) Severe ., by hx.;       Plan/Recommendations:     Continue care coordination.    This writer role is to address sx, hx, dx before psychiatry provider sees pt.    The pt expresses desire in OP therapy, appointment set up as follows:    Date: Tuesday, 7/18/2023 Time: 3:00 pm - 4:00 pm  Provider: Thierry SHARMA  [facility]  Location: [facility] [employer] [facility], [address] [place] [address]  Phone: [phone]  Type: Therapy - Initial (In-Person)      Given the pt with recent med adjustments, says she takes lamictal, requesting additional, and still on CIWA in withdrawal requiring valium, will ask psychiatry provider to follow -up, left message, follow up order placed, per [facility] workflow.    Next steps include: Psychiatry provider follow up.      Pt not medically cleared, still in withdrawal scoring on CIWA  Requiring diazapam, per nursing chart noting  Recent bipolar II dx  Recent med changes, additions, adjustments, withholdings Lamictal  Feels depression meds are not helping as much after menopause, nothing for anxiety\"     Reason for consult: Psychiatry consult was requested due to \"depression and anxiety, states she has bipolar II and would like to discuss tx options\" - per consult reason. Patient was seen by Triage and Transition Consult & Liaison team.     Identifying information: Kallie is 58 year old White  female   followed related to \"depression and anxiety, states she has " "bipolar II and would like to discuss tx options\" - per consult reason.     Brief Psychosocial History  Pt reports she lives in [place], that her daughters live with her, but will be going to college soon.  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       Summary of Patient Situation  Pt sleeping upon TH arrival, she is agreeable to meet via TH today.  She states she is here at the hospital \"seeking detox, in withdrawal\".  Pt states she is sleepy, thinks she just had \"valium\".  Pt not medically cleared, still scoring on CIWA, per chart review.  She states her current mood as \"I feel much better today than yesterday, I feel a little shaky still\".  Pt reports good appetite, states she just ate breakfast, she reports adequate sleep, states \"I take trazodone at home\".  Per chart review, pt with a hx of adjustment dx with mixed, alcohol use disorder, and most recently she seen a psychologist (Christina) whom indicated Biplaor II, with information available in epic ehr at the time of this note.    Today pt present with anxiety sx in the setting of recent relapse, of note pt is still scoring on CIWA protocol and not medically cleared.  Pt endorses anxiety sx including excessive worry, difficulty controlling the worry.  Pt denies any current depression sx, she states \"Im a little sad because I relapse, but no nothing excessive\".  Pt talks about recently seeing a psychologist for testing, and being dx with Bipolar II disorder, chart noting by \"Lissa Vasquez PsyD\" appears congruent from around May 2023, per chart review, please see that note for further detail.  Pt reports she was \"recently prescribed lamictal for this\", however does not appear in chart, and care team does not appear to be giving, per pt.  Pt denies SI,HI,AH/VH.  CSSR since last contact done in flow sheets tab of pt chart.  Pt denies any hx of attempts or psych hosp, appears congruent with chart review, with information " "available in epic ehr at the time of this note.  Pt reports she is hopeful, looking forward to feeling better and getting home to see daughters\".      Pt with alcohol use hx, reports she was sober prior to recent relapse.  She reports she is motivated to quit, and \"knows I can do it again I have done it before.  Pt to benefit from a CD consult for tx recs/resources, pt reports she is agreeable to this.    Pt reports adequate supports including \"my brother and sister, my 3 daughters, I feel well supported\".  Pt reports historical coping skills of \"volunteering in the community, spending time with family, meditation\", writer discussed deep breathing and mindfulness.      Pt reports she does not see a therapist, however she expresses desire in doing so - appointment set up in plan portion of this note.  Pt reports her PCP prescribes her psych meds, she reports recent adjustments, and recently prescribed \"lamictal\", however she reports she is not getting here at hospital, and does not appear to be in chart review.  She additionally reports she takes \"wellbutrin and trazodone\".  Regarding her perceived level of helpfulness, she states \"well I do not know how well the lamictal is working I only took it a week, and am not getting it here, and I'm not sure the wellbutrin is working as well since menopause, as well as I have nothing for anxiety\".      Given the pt with still in withdrawal, scoring on CIWA requiring valium, recent med changes, recent adjustments, she reports \"lamictal added but not getting here\" and requesting additional medication sx management, will ask psychiatry provider to follow-up, left message.          Significant Clinical History  Per chart review, pt with a hx of adjustment dx with mixed, alcohol use disorder, and most recently she seen a psychologist (Christina) whom indicated Biplaor II, with information available in epic ehr at the time of this note.    Current Providers  Primary Care " "Provider: Yes \"Joseph Nicolas\"   Psychiatrist: Yes She states Dr. Rolando Em prescribes her psych meds   Therapist: No      Collateral information:   Reviewed chart, coordinated with care team,  and coordinated with psychiatry provider for pt follow-up, left message.    Mental Status Exam   Affect: Appropriate  Appearance: Appropriate   Attention Span/Concentration: Attentive    Eye Contact: Engaged  Fund of Knowledge: Appropriate   Language /Speech Content: Fluent  Language /Speech Volume: Normal   Language /Speech Rate/Productions: Normal   Recent Memory: Intact  Remote Memory: Intact  Mood: Normal   Orientation:   Person: Yes   Place: Yes  Time of Day: Yes   Date: Yes   Situation (Do they understand why they are here?): Yes   Psychomotor Behavior: Normal   Thought Content: Clear  Thought Form: Intact    Current medications: Per EHR at the time of this note.  Current Facility-Administered Medications   Medication     acetaminophen (TYLENOL) tablet 650 mg    Or     acetaminophen (TYLENOL) solution 650 mg     atenolol (TENORMIN) half-tab 50 mg     bisacodyl (DULCOLAX) suppository 10 mg     buPROPion (WELLBUTRIN XL) 24 hr tablet 150 mg     cloNIDine (CATAPRES) tablet 0.1 mg     dextrose 5% and 0.9% NaCl infusion     glucose gel 15-30 g    Or     dextrose 50 % injection 25-50 mL    Or     glucagon injection 1 mg     diazepam (VALIUM) tablet 10 mg    Or     diazepam (VALIUM) injection 5-10 mg     enoxaparin ANTICOAGULANT (LOVENOX) injection 40 mg     flumazenil (ROMAZICON) injection 0.2 mg     [START ON 7/15/2023] folic acid (FOLVITE) tablet 1 mg     [START ON 7/13/2023] folic acid injection 1 mg     [START ON 7/19/2023] gabapentin (NEURONTIN) capsule 100 mg     [START ON 7/17/2023] gabapentin (NEURONTIN) capsule 300 mg     [START ON 7/15/2023] gabapentin (NEURONTIN) capsule 600 mg     gabapentin (NEURONTIN) capsule 900 mg     haloperidol lactate (HALDOL) injection 2.5-5 mg     magnesium hydroxide (MILK OF " MAGNESIA) suspension 30 mL     magnesium oxide (MAG-OX) tablet 400 mg     melatonin tablet 5 mg     multivitamin w/minerals (THERA-VIT-M) tablet 1 tablet     potassium & sodium phosphates (NEUTRA-PHOS) Packet 1 packet     senna-docusate (SENOKOT-S/PERICOLACE) 8.6-50 MG per tablet 1 tablet    Or     senna-docusate (SENOKOT-S/PERICOLACE) 8.6-50 MG per tablet 2 tablet     thiamine (B-1) 200 mg in sodium chloride 0.9 % 50 mL intermittent infusion     [START ON 7/14/2023] thiamine (B-1) tablet 100 mg     [START ON 7/19/2023] thiamine (B-1) tablet 100 mg        Therapeutic intervention and progress:  Therapeutic intervention consisted of building therapeutic rapport, active listening, validation, normalizing and CBT concepts. Patient is making progress towards treatment goals as evidenced by pt engaged in session.        Sabina LAMB  Psychotherapist Trainee  Triage and Transition - Consult and Liaison   [phone]

## 2023-07-12 NOTE — PROGRESS NOTES
"  [facility] ICU PROGRESS NOTE  07/12/2023      Date of Service (when I saw the patient): 07/12/2023    ASSESSMENT: Kallie Estrada is a 58 year old female with PMH notable for anxiety, depression, ETOH dependence. Presented to [facility] ED 7/11/23 with acute ETOH intoxication seeking detox. Admitted to ICU given metabolic acidosis and VS derangements on arrival concerning for development of overt DTs.        CHANGES AND PLAN:  - Trend lactic acid  - Lamictal, Lexapro per psych recs  - [facility] protocol  - Zofran PRN  - Discontinue continuous IVF     Neuro:  # Acute ETOH intoxication  # ETOH withdrawal  Endorses a binge drinking pattern up to 3 bottles of wine/day, last drink 7/11/23. Presented to ED 7/11 with ETOH 0.27. Seeking detox currently. High risk of complicated withdrawal. No hx of ETOH withdrawal seizures. Patient reports has been sober for a number of years in the past and relapsed in May 2023 2/2 various life stressors.  ? ETOH in ED 0.27, UDS negative  ? CIWA protocol  - Diazepam PRN  - Gabapentin taper  - Haloperidol PRN [EKG without QT/QTc prolongation]  - Consider dex gtt to attenuate BZD requirement   ? Seizure precautions  ? Thiamine and folate supplements   ? Denies suicidal ideation  ? Chem dep consult, pt agreeable to be seen     # Depression  # Anxiety  # Bipolar 2 disorder  Per discussion with patient's daughter, patient was endorsing suicidal ideation without plan the night she was brought to the ED. Patient's daughter reports she received a call from her mom stating \"I want to die but I don't know how\". Patient currently denying any suicidal ideation.  ? Psychiatry consult    Resume PTA Lamictal 100 mg PO nightly    Start Lexapro 5 mg daily beginning 7/13    Discontinue Wellbutrin     Pulmonary:  No acute concerns  ? Supplemental O2 to keep SPO2 > 92%     Cardiovascular:  # Tachycardia, likely 2/2 dehydration and withdrawal, resolved  ? VS and tele per ICU routine   ? Goal HR < 130, goal MAP > 65 " mmHg      GI/Nutrition:  # Elevated transaminases 2/2 ETOH use  # Elevated lipase, resolved   # High risk for re-feeding syndrome   ? Regular diet   ? LFTs now WNL  ? Lipase 64 --> 62  ? INR WNL  ? PRN bowel regimen   ? PRN Zofran for nausea     Renal/Fluids/Electrolytes:  # AGMA 2/2 alcoholic ketoacidosis and mild lactic acidosis, resolving   # Mild hyponatremia, likely 2/2 low solute intake   ? Lactic acid, serum ketones downtrending  ? VBG with metabolic acidosis, anion gap improved  ? Repeat lactic acid, ketones, VBG, BMP at 1200  ? Discontinue D5 0.9 @ 100 mL/hr  ? High intensity electrolyte replacement protocol  ? Monitor fluid balance with I&O and daily weights, no singh needed     Endocrine:  # Hypoglycemia  ? Monitor BG q4h  ? TSH 0.18, free T4 1.13      ID:  # Leukocytosis, improving  ? U/UC ordered   ? Monitor fever curve, consider infectious work-up if patient if fevering     Hematology:    # No acute concerns  ? Daily CBC  ? PCDs  ? Discontinue Lovenox     Musculoskeletal:  # Risk for deconditioning  ? Ambulatory PTA, will hold off on PT/OT for now, consider if pt has limited activity during acute phase of detox       Skin:  No acute concerns  ? Diligent skin cares to prevent breakdown     General Cares/Prophylaxis:    DVT Prophylaxis: Pneumatic Compression Devices  GI Prophylaxis: Not indicated  Restraints: Not indicated   Family Communication: Daughter at bedside, updated  Code Status: Full     Lines/tubes/drains:  - PIVs     Disposition:  ? [facility]- adult ICU  ?   Patient seen and findings/plan discussed with medical ICU staff, Dr. Lambert.    Ruthie Herrera, APRN CNP         I spent 40 minutes of critical care time with patient.       ====================================  INTERVAL HISTORY:   Admitted overnight. Patient tearful this morning. States she was recently diagnosed with Bipolar 2 disorder, and she feels overall she has been well managed on her current dosage of Lamictal. Reports over the fourth  of July she experienced some challenging interactions with her daughter which led to her relapse of alcohol abuse of which she had been sober 10 years. Expresses grief related to mental health concerns within her family and feeling disappointed for relapsing. No chest pain or shortness of breath. Slightly nauseas, no abdominal pain. Mild 2/10 headache. No auditory or visual hallucinations. No tremors noted.     OBJECTIVE:   1. VITAL SIGNS:   Temp:  [97.7  F (36.5  C)-99.2  F (37.3  C)] 98.2  F (36.8  C)  Pulse:  [] 81  Resp:  [16-23] 18  BP: (105-154)/(67-90) 123/69  SpO2:  [95 %-100 %] 99 %  Resp: 18    2. INTAKE/ OUTPUT:   I/O last 3 completed shifts:  In: 1050 [I.V.:50; IV Piggyback:1000]  Out: -     3. PHYSICAL EXAMINATION:  General: Awake and alert, tearful. NAD.   HEENT: Normocephalic, atraumatic. Sclera non-icteric. Mucous membranes moist, intact.   Neuro: A&Ox4. Equal strength in all extremities.   Pulm/Resp: Clear breath sounds bilaterally without rhonchi, crackles or wheeze, breathing non-labored. On room air.   CV: RRR, S1S2, no murmur, rub, or gallop. No edema.   Abdomen: Soft, non-distended, non-tender.   : Voiding spontaneously without difficulty.   Incisions/Skin: Warm, dry, intact. No rashes or lesions.     4. LABS:   Arterial Blood Gases   No lab results found in last 7 days.  Complete Blood Count   Recent Labs   Lab 07/12/23  0539 07/12/23  0400 07/11/23  2259   WBC 10.4 9.7 11.6*   HGB 11.8 11.3* 15.5    227 331     Basic Metabolic Panel  Recent Labs   Lab 07/12/23  0752 07/12/23  0539 07/12/23  0359 07/12/23  0303 07/12/23  0133 07/11/23  2343   NA  --  132* 137  --   --  133*   POTASSIUM  --  4.4 3.7  --   --  4.7   CHLORIDE  --  105 110*  --   --  96*   CO2  --  10* 9*  --   --  7*   BUN  --  16.6 15.4  --   --  21.1*   CR  --  0.68 0.58  --   --  0.78  0.78   * 248* 743* 86   < > 57*    < > = values in this interval not displayed.     Liver Function Tests  Recent Labs    Lab 07/12/23  0539 07/12/23  0359 07/11/23  2343   AST 44 42 67*   ALT 43 39 59*   ALKPHOS 64 58 93   BILITOTAL 0.3 0.2 0.2   ALBUMIN 3.3* 3.0* 4.3   INR  --  1.10  --      Coagulation Profile  Recent Labs   Lab 07/12/23  0359   INR 1.10       5. RADIOLOGY:   No results found for this or any previous visit (from the past 24 hour(s)).

## 2023-07-12 NOTE — PHARMACY-ADMISSION MEDICATION HISTORY
Pharmacist Admission Medication History    Admission medication history is complete. The information provided in this note is only as accurate as the sources available at the time of the update.    Medication reconciliation/reorder completed by provider prior to medication history? Yes    Information Source(s): Patient via in-person and medication fill history    Pertinent Information: Lamotrigine: current dose 100 mg po daily. Last dose was taken 2 days ago.  Hydroxyzine: was prescribed to pt. However, she has never started it. Did not add it to the list.    Changes made to PTA medication list:    Added: Estradiol, medroxyprogesterone, lamotrigine, multivitamin w/ mineral, B complex    Deleted: Diphenhydramine-acetaminophen, melatonin    Changed: None    Medication Affordability: N/A       Allergies reviewed with patient and updates made in EHR: yes    Medication History Completed By: Robert Walls RPH 7/12/2023 12:20 PM    Prior to Admission medications    Medication Sig Last Dose Taking? Auth Provider Long Term End Date   buPROPion (WELLBUTRIN XL) 150 MG 24 hr tablet Take 150 mg by mouth every morning 7/10/2023 Yes Reported, Patient Yes    estradiol (ESTRACE) 1 MG tablet Take 1 mg by mouth daily 7/10/2023 Yes Unknown, Entered By History Yes    lamoTRIgine (LAMICTAL) 100 MG tablet Take 100 mg by mouth daily 7/10/2023 Yes Unknown, Entered By History     MEDROXYPROGESTERONE ACETATE PO Take 5 mg by mouth daily 7/10/2023 Yes Unknown, Entered By History     multivitamin w/minerals (THERA-VIT-M) tablet Take 1 tablet by mouth daily Past Week Yes Unknown, Entered By History     traZODone (DESYREL) 50 MG tablet Take 25 mg by mouth nightly as needed for sleep Past Month Yes Unknown, Entered By History Yes    vitamin B complex with vitamin C (STRESS TAB) tablet Take 1 tablet by mouth daily Past Week Yes Unknown, Entered By History

## 2023-07-12 NOTE — ED PROVIDER NOTES
"    [facility] EMERGENCY DEPARTMENT ([facility])    7/11/23         History     Chief Complaint   Patient presents with     Suicidal     Plan: \"drown\" Denies any SI     Alcohol Intoxication     \"Has been binge drinking for the past 2 days\" Denies any seizures      HPI   Kallie Estrada is a 58 year old female who with PMH of anxiety, polysubstance abuse presents now acutely intoxicated initially requesting detox once again acutely intoxicated and initial lab evaluation reveals acidosis with abnormal metabolic studies.  Patient denies any suicidal or homicidal ideation denies any other acute concerns but is a very poor historian.      Past Medical History  No past medical history on file.  No past surgical history on file.  No current outpatient medications on file.    Allergies   Allergen Reactions     Eggs [Chicken-Derived Products (Egg)] Anaphylaxis     Sulfa Antibiotics Anaphylaxis     Family History  Family History   Problem Relation Age of Onset     Substance Abuse Paternal Grandfather      Substance Abuse Brother      Social History   Social History     Tobacco Use     Smoking status: Former     Smokeless tobacco: Never         A medically appropriate review of systems was performed with pertinent positives and negatives noted in the HPI, and all other systems negative.    Physical Exam   BP: (!) 152/80  Pulse: (!) 138  Temp: 98.3  F (36.8  C)  Resp: 16  Height: 160 cm (5' 3\")  Weight: 57 kg (125 lb 10.6 oz)  SpO2: 97 %  Physical Exam  Constitutional:       General: She is not in acute distress.     Appearance: Normal appearance. She is not diaphoretic.   HENT:      Head: Atraumatic.      Mouth/Throat:      Mouth: Mucous membranes are moist.   Eyes:      General: No scleral icterus.     Conjunctiva/sclera: Conjunctivae normal.   Cardiovascular:      Rate and Rhythm: Normal rate.      Heart sounds: Normal heart sounds.   Pulmonary:      Effort: No respiratory distress.      Breath sounds: Normal breath " sounds.   Abdominal:      General: Abdomen is flat.   Musculoskeletal:      Cervical back: Neck supple.   Skin:     General: Skin is warm.      Findings: No rash.   Neurological:      General: No focal deficit present.      Mental Status: She is alert.      Sensory: No sensory deficit.      Coordination: Coordination normal.   Psychiatric:         Mood and Affect: Mood is anxious.         Speech: Speech is slurred.         Behavior: Behavior is cooperative.         Thought Content: Thought content does not include homicidal or suicidal ideation.           ED Course, Procedures, & Data      Procedures       Results for orders placed or performed during the hospital encounter of 07/11/23   CT Head w/o Contrast     Status: None    Narrative    EXAM: CT HEAD W/O CONTRAST  7/14/2023 3:56 PM     HISTORY: h.o fall. L hemicranial headache.       COMPARISON: 4/10/2010    TECHNIQUE: Using multidetector thin collimation helical acquisition  technique, axial, coronal and sagittal CT images from the skull base  to the vertex were obtained without intravenous contrast.   (topogram) image(s) also obtained and reviewed.    FINDINGS:  No acute intracranial hemorrhage, mass effect, or midline shift. No  acute loss of gray-white matter differentiation in the cerebral  hemispheres. Ventricles are proportionate to the cerebral sulci. Cavum  septa pellucida. Clear basal cisterns.    The bony calvaria and the bones of the skull base are normal. The  visualized portions of the paranasal sinuses and mastoid air cells are  clear. Grossly normal orbits.       Impression    IMPRESSION: No acute intracranial pathology.     I have personally reviewed the examination and initial interpretation  and I agree with the findings.    JUN NOGUERA MD         SYSTEM ID:  [id number]   Comprehensive metabolic panel     Status: Abnormal   Result Value Ref Range    Sodium 133 (L) 136 - 145 mmol/L    Potassium 4.7 3.4 - 5.3 mmol/L    Chloride 96 (L) 98 -  107 mmol/L    Carbon Dioxide (CO2) 7 (LL) 22 - 29 mmol/L    Anion Gap 30 (H) 7 - 15 mmol/L    Urea Nitrogen 21.1 (H) 6.0 - 20.0 mg/dL    Creatinine 0.78 0.51 - 0.95 mg/dL    Calcium 8.3 (L) 8.6 - 10.0 mg/dL    Glucose 57 (L) 70 - 99 mg/dL    Alkaline Phosphatase 93 35 - 104 U/L    AST 67 (H) 0 - 45 U/L    ALT 59 (H) 0 - 50 U/L    Protein Total 6.7 6.4 - 8.3 g/dL    Albumin 4.3 3.5 - 5.2 g/dL    Bilirubin Total 0.2 <=1.2 mg/dL    GFR Estimate 88 >60 mL/min/1.73m2   Lipase     Status: Abnormal   Result Value Ref Range    Lipase 64 (H) 13 - 60 U/L   Ethyl Alcohol Level     Status: Abnormal   Result Value Ref Range    Alcohol ethyl 0.27 (H) <=0.01 g/dL   UA with Microscopic reflex to Culture     Status: Abnormal    Specimen: Urine, Clean Catch   Result Value Ref Range    Color Urine Yellow Colorless, Straw, Light Yellow, Yellow    Appearance Urine Slightly Cloudy (A) Clear    Glucose Urine 200 (A) Negative mg/dL    Bilirubin Urine Negative Negative    Ketones Urine 20 (A) Negative mg/dL    Specific Gravity Urine 1.022 1.003 - 1.035    Blood Urine Large (A) Negative    pH Urine 6.0 5.0 - 7.0    Protein Albumin Urine 50 (A) Negative mg/dL    Urobilinogen Urine Normal Normal, 2.0 mg/dL    Nitrite Urine Negative Negative    Leukocyte Esterase Urine Moderate (A) Negative    RBC Urine 6 (H) <=2 /HPF    WBC Urine 16 (H) <=5 /HPF    Squamous Epithelials Urine 5 (H) <=1 /HPF    Transitional Epithelials Urine <1 <=1 /HPF    Narrative    Urine Culture ordered based on laboratory criteria   CBC with platelets and differential     Status: Abnormal   Result Value Ref Range    WBC Count 11.6 (H) 4.0 - 11.0 10e3/uL    RBC Count 5.04 3.80 - 5.20 10e6/uL    Hemoglobin 15.5 11.7 - 15.7 g/dL    Hematocrit 47.5 (H) 35.0 - 47.0 %    MCV 94 78 - 100 fL    MCH 30.8 26.5 - 33.0 pg    MCHC 32.6 31.5 - 36.5 g/dL    RDW 13.6 10.0 - 15.0 %    Platelet Count 331 150 - 450 10e3/uL   Manual Differential     Status: Abnormal   Result Value Ref Range     % Neutrophils 88 %    % Lymphocytes 3 %    % Monocytes 3 %    % Eosinophils 0 %    % Basophils 0 %    % Metamyelocytes 4 %    % Myelocytes 2 %    Absolute Neutrophils 10.2 (H) 1.6 - 8.3 10e3/uL    Absolute Lymphocytes 0.3 (L) 0.8 - 5.3 10e3/uL    Absolute Monocytes 0.3 0.0 - 1.3 10e3/uL    Absolute Eosinophils 0.0 0.0 - 0.7 10e3/uL    Absolute Basophils 0.0 0.0 - 0.2 10e3/uL    Absolute Metamyelocytes 0.5 (H) <=0.0 10e3/uL    Absolute Myelocytes 0.2 (H) <=0.0 10e3/uL    RBC Morphology Confirmed RBC Indices     Platelet Assessment  Automated Count Confirmed. Platelet morphology is normal.     Automated Count Confirmed. Platelet morphology is normal.   Lactic acid whole blood     Status: Abnormal   Result Value Ref Range    Lactic Acid 2.7 (H) 0.7 - 2.0 mmol/L   Magnesium     Status: Normal   Result Value Ref Range    Magnesium 2.1 1.7 - 2.3 mg/dL   Phosphorus     Status: Normal   Result Value Ref Range    Phosphorus 3.8 2.5 - 4.5 mg/dL   Ketone Beta-Hydroxybutyrate Quantitative     Status: Abnormal   Result Value Ref Range    Ketone (Beta-Hydroxybutyrate) Quantitative 8.10 (HH) <=0.30 mmol/L   Osmolality     Status: Abnormal   Result Value Ref Range    Osmolality Blood 348 (HH) 275 - 295 mmol/kg    Narrative    Greater than 385 mmol/kg relates to stupor in hyperglycemia   Greater than 400 mmol/kg can relate to seizures   Greater than 420 mmol/kg can be lethal    Serum Osmalar Gap:   Normal <10   Larger suggest unmeasured substances present in serum (ethanol, methanol, isopropanol, mannitol, ethylene glycol).   Salicylate level     Status: Normal   Result Value Ref Range    Salicylate <0.3   mg/dL   Acetaminophen level     Status: Abnormal   Result Value Ref Range    Acetaminophen <5.0 (L) 10.0 - 30.0 ug/mL   TSH with free T4 reflex     Status: Abnormal   Result Value Ref Range    TSH 0.18 (L) 0.30 - 4.20 uIU/mL   T4 free     Status: Normal   Result Value Ref Range    Free T4 1.13 0.90 - 1.70 ng/dL   Glucose by meter      Status: Abnormal   Result Value Ref Range    GLUCOSE BY METER POCT 161 (H) 70 - 99 mg/dL   Lactic acid whole blood     Status: Abnormal   Result Value Ref Range    Lactic Acid 2.1 (H) 0.7 - 2.0 mmol/L   CBC with platelets     Status: Abnormal   Result Value Ref Range    WBC Count 9.7 4.0 - 11.0 10e3/uL    RBC Count 3.67 (L) 3.80 - 5.20 10e6/uL    Hemoglobin 11.3 (L) 11.7 - 15.7 g/dL    Hematocrit 36.6 35.0 - 47.0 %     78 - 100 fL    MCH 30.8 26.5 - 33.0 pg    MCHC 30.9 (L) 31.5 - 36.5 g/dL    RDW 14.0 10.0 - 15.0 %    Platelet Count 227 150 - 450 10e3/uL   Blood gas venous and oxyhgb     Status: Abnormal   Result Value Ref Range    pH Venous 7.20 (L) 7.32 - 7.43    pCO2 Venous 30 (L) 40 - 50 mm Hg    pO2 Venous 43 25 - 47 mm Hg    Bicarbonate Venous 12 (L) 21 - 28 mmol/L    FIO2 21     Oxyhemoglobin Venous 64 (L) 70 - 75 %    Base Excess/Deficit (+/-) -15.2 (L) -7.7 - 1.9 mmol/L   Comprehensive metabolic panel     Status: Abnormal   Result Value Ref Range    Sodium 137 136 - 145 mmol/L    Potassium 3.7 3.4 - 5.3 mmol/L    Chloride 110 (H) 98 - 107 mmol/L    Carbon Dioxide (CO2) 9 (LL) 22 - 29 mmol/L    Anion Gap 18 (H) 7 - 15 mmol/L    Urea Nitrogen 15.4 6.0 - 20.0 mg/dL    Creatinine 0.58 0.51 - 0.95 mg/dL    Calcium 5.7 (LL) 8.6 - 10.0 mg/dL    Glucose 743 (HH) 70 - 99 mg/dL    Alkaline Phosphatase 58 35 - 104 U/L    AST 42 0 - 45 U/L    ALT 39 0 - 50 U/L    Protein Total 4.4 (L) 6.4 - 8.3 g/dL    Albumin 3.0 (L) 3.5 - 5.2 g/dL    Bilirubin Total 0.2 <=1.2 mg/dL    GFR Estimate >90 >60 mL/min/1.73m2   Magnesium     Status: Abnormal   Result Value Ref Range    Magnesium 1.5 (L) 1.7 - 2.3 mg/dL   Phosphorus     Status: Abnormal   Result Value Ref Range    Phosphorus 2.0 (L) 2.5 - 4.5 mg/dL   Ionized Calcium     Status: Abnormal   Result Value Ref Range    Calcium Ionized 3.7 (L) 4.4 - 5.2 mg/dL   Lipase     Status: Abnormal   Result Value Ref Range    Lipase 62 (H) 13 - 60 U/L   Ammonia     Status: Normal    Result Value Ref Range    Ammonia 26 11 - 51 umol/L   INR     Status: Normal   Result Value Ref Range    INR 1.10 0.85 - 1.15   Procalcitonin     Status: Abnormal   Result Value Ref Range    Procalcitonin 0.11 (H) <0.05 ng/mL   Creatinine     Status: Normal   Result Value Ref Range    Creatinine 0.78 0.51 - 0.95 mg/dL    GFR Estimate 88 >60 mL/min/1.73m2   Acetaminophen level     Status: Abnormal   Result Value Ref Range    Acetaminophen <5.0 (L) 10.0 - 30.0 ug/mL   Salicylate level     Status: Normal   Result Value Ref Range    Salicylate <0.3   mg/dL   Osmolality     Status: Abnormal   Result Value Ref Range    Osmolality Blood 355 (HH) 275 - 295 mmol/kg    Narrative    Greater than 385 mmol/kg relates to stupor in hyperglycemia   Greater than 400 mmol/kg can relate to seizures   Greater than 420 mmol/kg can be lethal    Serum Osmalar Gap:   Normal <10   Larger suggest unmeasured substances present in serum (ethanol, methanol, isopropanol, mannitol, ethylene glycol).   Glucose by meter     Status: Normal   Result Value Ref Range    GLUCOSE BY METER POCT 86 70 - 99 mg/dL   Comprehensive metabolic panel     Status: Abnormal   Result Value Ref Range    Sodium 132 (L) 136 - 145 mmol/L    Potassium 4.4 3.4 - 5.3 mmol/L    Chloride 105 98 - 107 mmol/L    Carbon Dioxide (CO2) 10 (LL) 22 - 29 mmol/L    Anion Gap 17 (H) 7 - 15 mmol/L    Urea Nitrogen 16.6 6.0 - 20.0 mg/dL    Creatinine 0.68 0.51 - 0.95 mg/dL    Calcium 6.6 (L) 8.6 - 10.0 mg/dL    Glucose 248 (H) 70 - 99 mg/dL    Alkaline Phosphatase 64 35 - 104 U/L    AST 44 0 - 45 U/L    ALT 43 0 - 50 U/L    Protein Total 4.8 (L) 6.4 - 8.3 g/dL    Albumin 3.3 (L) 3.5 - 5.2 g/dL    Bilirubin Total 0.3 <=1.2 mg/dL    GFR Estimate >90 >60 mL/min/1.73m2   Ionized Calcium     Status: Abnormal   Result Value Ref Range    Calcium Ionized 4.2 (L) 4.4 - 5.2 mg/dL   Ketone Beta-Hydroxybutyrate Quantitative     Status: Abnormal   Result Value Ref Range    Ketone  (Beta-Hydroxybutyrate) Quantitative 3.40 (HH) <=0.30 mmol/L   Blood gas venous     Status: Abnormal   Result Value Ref Range    pH Venous 7.24 (L) 7.32 - 7.43    pCO2 Venous 27 (L) 40 - 50 mm Hg    pO2 Venous 57 (H) 25 - 47 mm Hg    Bicarbonate Venous 12 (L) 21 - 28 mmol/L    Base Excess/Deficit (+/-) -14.4 (L) -7.7 - 1.9 mmol/L    FIO2 21    Magnesium     Status: Normal   Result Value Ref Range    Magnesium 1.8 1.7 - 2.3 mg/dL   Phosphorus     Status: Abnormal   Result Value Ref Range    Phosphorus 2.0 (L) 2.5 - 4.5 mg/dL   CBC with platelets     Status: Normal   Result Value Ref Range    WBC Count 10.4 4.0 - 11.0 10e3/uL    RBC Count 3.82 3.80 - 5.20 10e6/uL    Hemoglobin 11.8 11.7 - 15.7 g/dL    Hematocrit 36.2 35.0 - 47.0 %    MCV 95 78 - 100 fL    MCH 30.9 26.5 - 33.0 pg    MCHC 32.6 31.5 - 36.5 g/dL    RDW 13.8 10.0 - 15.0 %    Platelet Count 222 150 - 450 10e3/uL   Osmolality     Status: Abnormal   Result Value Ref Range    Osmolality Blood 309 (H) 275 - 295 mmol/kg    Narrative    Greater than 385 mmol/kg relates to stupor in hyperglycemia   Greater than 400 mmol/kg can relate to seizures   Greater than 420 mmol/kg can be lethal    Serum Osmalar Gap:   Normal <10   Larger suggest unmeasured substances present in serum (ethanol, methanol, isopropanol, mannitol, ethylene glycol).   Ionized Calcium     Status: Normal   Result Value Ref Range    Calcium Ionized 4.6 4.4 - 5.2 mg/dL   Blood gas venous     Status: Abnormal   Result Value Ref Range    pH Venous 7.35 7.32 - 7.43    pCO2 Venous 31 (L) 40 - 50 mm Hg    pO2 Venous 51 (H) 25 - 47 mm Hg    Bicarbonate Venous 17 (L) 21 - 28 mmol/L    Base Excess/Deficit (+/-) -7.1 -7.7 - 1.9 mmol/L    FIO2 20    Lactic acid whole blood     Status: Abnormal   Result Value Ref Range    Lactic Acid 2.7 (H) 0.7 - 2.0 mmol/L   Basic metabolic panel     Status: Abnormal   Result Value Ref Range    Sodium 134 (L) 136 - 145 mmol/L    Potassium 4.4 3.4 - 5.3 mmol/L    Chloride 107  98 - 107 mmol/L    Carbon Dioxide (CO2) 16 (L) 22 - 29 mmol/L    Anion Gap 11 7 - 15 mmol/L    Urea Nitrogen 16.3 6.0 - 20.0 mg/dL    Creatinine 0.75 0.51 - 0.95 mg/dL    Calcium 7.8 (L) 8.6 - 10.0 mg/dL    Glucose 244 (H) 70 - 99 mg/dL    GFR Estimate >90 >60 mL/min/1.73m2   Glucose by meter     Status: Abnormal   Result Value Ref Range    GLUCOSE BY METER POCT 141 (H) 70 - 99 mg/dL   Ketone Beta-Hydroxybutyrate Quantitative     Status: Normal   Result Value Ref Range    Ketone (Beta-Hydroxybutyrate) Quantitative <0.18 <=0.30 mmol/L   Glucose by meter     Status: Abnormal   Result Value Ref Range    GLUCOSE BY METER POCT 191 (H) 70 - 99 mg/dL   Lactic acid whole blood     Status: Abnormal   Result Value Ref Range    Lactic Acid 2.8 (H) 0.7 - 2.0 mmol/L   Lactic acid whole blood     Status: Normal   Result Value Ref Range    Lactic Acid 1.5 0.7 - 2.0 mmol/L   Basic metabolic panel     Status: Abnormal   Result Value Ref Range    Sodium 136 136 - 145 mmol/L    Potassium 4.2 3.4 - 5.3 mmol/L    Chloride 109 (H) 98 - 107 mmol/L    Carbon Dioxide (CO2) 19 (L) 22 - 29 mmol/L    Anion Gap 8 7 - 15 mmol/L    Urea Nitrogen 16.2 6.0 - 20.0 mg/dL    Creatinine 0.79 0.51 - 0.95 mg/dL    Calcium 7.8 (L) 8.6 - 10.0 mg/dL    Glucose 137 (H) 70 - 99 mg/dL    GFR Estimate 86 >60 mL/min/1.73m2   Blood gas venous     Status: Abnormal   Result Value Ref Range    pH Venous 7.41 7.32 - 7.43    pCO2 Venous 33 (L) 40 - 50 mm Hg    pO2 Venous 54 (H) 25 - 47 mm Hg    Bicarbonate Venous 21 21 - 28 mmol/L    Base Excess/Deficit (+/-) -2.7 -7.7 - 1.9 mmol/L    FIO2 0    Extra Tube     Status: None    Narrative    The following orders were created for panel order Extra Tube.  Procedure                               Abnormality         Status                     ---------                               -----------         ------                     Extra Red Top Tube[[id number]]                               Final result               Extra Purple  Top Tube[[id number]]                                                         Please view results for these tests on the individual orders.   Extra Red Top Tube     Status: None   Result Value Ref Range    Hold Specimen JI    Magnesium     Status: Normal   Result Value Ref Range    Magnesium 1.9 1.7 - 2.3 mg/dL   Phosphorus     Status: Abnormal   Result Value Ref Range    Phosphorus 1.1 (L) 2.5 - 4.5 mg/dL   Comprehensive metabolic panel     Status: Abnormal   Result Value Ref Range    Sodium 135 (L) 136 - 145 mmol/L    Potassium 4.1 3.4 - 5.3 mmol/L    Chloride 109 (H) 98 - 107 mmol/L    Carbon Dioxide (CO2) 19 (L) 22 - 29 mmol/L    Anion Gap 7 7 - 15 mmol/L    Urea Nitrogen 16.1 6.0 - 20.0 mg/dL    Creatinine 0.71 0.51 - 0.95 mg/dL    Calcium 7.9 (L) 8.6 - 10.0 mg/dL    Glucose 97 70 - 99 mg/dL    Alkaline Phosphatase 58 35 - 104 U/L    AST 37 0 - 45 U/L    ALT 40 0 - 50 U/L    Protein Total 4.6 (L) 6.4 - 8.3 g/dL    Albumin 3.0 (L) 3.5 - 5.2 g/dL    Bilirubin Total 0.6 <=1.2 mg/dL    GFR Estimate >90 >60 mL/min/1.73m2   CBC with platelets     Status: Abnormal   Result Value Ref Range    WBC Count 6.3 4.0 - 11.0 10e3/uL    RBC Count 3.49 (L) 3.80 - 5.20 10e6/uL    Hemoglobin 10.7 (L) 11.7 - 15.7 g/dL    Hematocrit 32.6 (L) 35.0 - 47.0 %    MCV 93 78 - 100 fL    MCH 30.7 26.5 - 33.0 pg    MCHC 32.8 31.5 - 36.5 g/dL    RDW 14.4 10.0 - 15.0 %    Platelet Count 167 150 - 450 10e3/uL   Glucose by meter     Status: Abnormal   Result Value Ref Range    GLUCOSE BY METER POCT 101 (H) 70 - 99 mg/dL   Drug abuse screen 6 urine (chem dep) ([facility])     Status: Abnormal   Result Value Ref Range    Amphetamines Urine Screen Negative Screen Negative    Barbituates Urine Screen Negative Screen Negative    Benzodiazepine Urine Screen Positive (A) Screen Negative    Cannabinoids Urine Screen Positive (A) Screen Negative    Cocaine Urine Screen Negative Screen Negative    Ethanol Urine Screen Negative Screen Negative    Opiates Urine  Screen Negative Screen Negative   Comprehensive metabolic panel     Status: Abnormal   Result Value Ref Range    Sodium 140 136 - 145 mmol/L    Potassium 4.2 3.4 - 5.3 mmol/L    Chloride 108 (H) 98 - 107 mmol/L    Carbon Dioxide (CO2) 25 22 - 29 mmol/L    Anion Gap 7 7 - 15 mmol/L    Urea Nitrogen 10.2 6.0 - 20.0 mg/dL    Creatinine 0.79 0.51 - 0.95 mg/dL    Calcium 7.9 (L) 8.6 - 10.0 mg/dL    Glucose 108 (H) 70 - 99 mg/dL    Alkaline Phosphatase 73 35 - 104 U/L    AST 33 0 - 45 U/L    ALT 35 0 - 50 U/L    Protein Total 4.7 (L) 6.4 - 8.3 g/dL    Albumin 3.1 (L) 3.5 - 5.2 g/dL    Bilirubin Total 0.5 <=1.2 mg/dL    GFR Estimate 86 >60 mL/min/1.73m2   CBC with platelets     Status: Abnormal   Result Value Ref Range    WBC Count 5.4 4.0 - 11.0 10e3/uL    RBC Count 3.43 (L) 3.80 - 5.20 10e6/uL    Hemoglobin 10.2 (L) 11.7 - 15.7 g/dL    Hematocrit 32.9 (L) 35.0 - 47.0 %    MCV 96 78 - 100 fL    MCH 29.7 26.5 - 33.0 pg    MCHC 31.0 (L) 31.5 - 36.5 g/dL    RDW 14.0 10.0 - 15.0 %    Platelet Count 147 (L) 150 - 450 10e3/uL   Magnesium     Status: Normal   Result Value Ref Range    Magnesium 1.9 1.7 - 2.3 mg/dL   Phosphorus     Status: Abnormal   Result Value Ref Range    Phosphorus 1.5 (L) 2.5 - 4.5 mg/dL   Phosphorus     Status: Abnormal   Result Value Ref Range    Phosphorus 2.1 (L) 2.5 - 4.5 mg/dL   EKG 12-lead, tracing only     Status: None   Result Value Ref Range    Systolic Blood Pressure  mmHg    Diastolic Blood Pressure  mmHg    Ventricular Rate 109 BPM    Atrial Rate 109 BPM    OK Interval 154 ms    QRS Duration 88 ms     ms    QTc 457 ms    P Axis 62 degrees    R AXIS 72 degrees    T Axis 65 degrees    Interpretation ECG       Sinus tachycardia  Otherwise normal ECG  Unconfirmed report - interpretation of this ECG is computer generated - see medical record for final interpretation  Confirmed by - EMERGENCY ROOM, PHYSICIAN ([id number]),  GAVI AUGUSTIN ([id number]) on 7/12/2023 6:40:09 AM     EKG 12-lead,  complete     Status: None   Result Value Ref Range    Systolic Blood Pressure  mmHg    Diastolic Blood Pressure  mmHg    Ventricular Rate 92 BPM    Atrial Rate 92 BPM    GA Interval 170 ms    QRS Duration 90 ms     ms    QTc 460 ms    P Axis 59 degrees    R AXIS 71 degrees    T Axis 69 degrees    Interpretation ECG       Sinus rhythm  Normal ECG  No previous ECGs available  Confirmed by Keshawn Reed ([id number]) on 7/12/2023 9:15:19 AM     Urine Culture     Status: None    Specimen: Urine, Clean Catch   Result Value Ref Range    Culture 10,000-50,000 CFU/mL Mixture of urogenital jolene    CBC with platelets differential     Status: Abnormal    Narrative    The following orders were created for panel order CBC with platelets differential.  Procedure                               Abnormality         Status                     ---------                               -----------         ------                     CBC with platelets and d...[[id number]]  Abnormal            Final result               Manual Differential[[id number]]          Abnormal            Final result                 Please view results for these tests on the individual orders.     Medications   atenolol (TENORMIN) tablet 50 mg (50 mg Oral $Given 7/12/23 0021)   glucose gel 15-30 g (has no administration in time range)     Or   dextrose 50 % injection 25-50 mL (has no administration in time range)     Or   glucagon injection 1 mg (has no administration in time range)   flumazenil (ROMAZICON) injection 0.2 mg (has no administration in time range)   folic acid (FOLVITE) tablet 1 mg (has no administration in time range)   thiamine (B-1) tablet 100 mg (100 mg Oral $Given 7/14/23 2032)   thiamine (B-1) tablet 100 mg (has no administration in time range)   multivitamin w/minerals (THERA-VIT-M) tablet 1 tablet (1 tablet Oral $Given 7/14/23 0917)   haloperidol lactate (HALDOL) injection 2.5-5 mg (has no administration in time range)   gabapentin (NEURONTIN)  capsule 900 mg (900 mg Oral $Given 7/14/23 2025)   gabapentin (NEURONTIN) capsule 600 mg (has no administration in time range)   gabapentin (NEURONTIN) capsule 300 mg (has no administration in time range)   gabapentin (NEURONTIN) capsule 100 mg (has no administration in time range)   melatonin tablet 5 mg (5 mg Oral $Given 7/14/23 2027)   acetaminophen (TYLENOL) tablet 650 mg (650 mg Oral $Given 7/13/23 0840)     Or   acetaminophen (TYLENOL) solution 650 mg ( Per NG tube See Alternative 7/13/23 0840)   senna-docusate (SENOKOT-S/PERICOLACE) 8.6-50 MG per tablet 1 tablet ( Oral See Alternative 7/14/23 2032)     Or   senna-docusate (SENOKOT-S/PERICOLACE) 8.6-50 MG per tablet 2 tablet (2 tablets Oral $Given 7/14/23 2032)   magnesium hydroxide (MILK OF MAGNESIA) suspension 30 mL (30 mLs Oral $Given 7/14/23 2212)   bisacodyl (DULCOLAX) suppository 10 mg (has no administration in time range)   ondansetron (ZOFRAN) injection 4 mg (has no administration in time range)   lamoTRIgine (LaMICtal) tablet 100 mg (100 mg Oral $Given 7/14/23 2027)   escitalopram (LEXAPRO) tablet 5 mg (5 mg Oral $Given 7/14/23 0916)   estradiol (ESTRACE) tablet 1 mg (1 mg Oral $Given 7/14/23 0915)   medroxyPROGESTERone (PROVERA) tablet 5 mg (5 mg Oral $Given 7/14/23 0945)   traZODone (DESYREL) half-tab 25 mg (has no administration in time range)   cloNIDine (CATAPRES) tablet 0.1 mg (0.1 mg Oral $Given 7/14/23 2026)   diazepam (VALIUM) tablet 5 mg (has no administration in time range)   hydrOXYzine (ATARAX) tablet 50 mg (50 mg Oral $Given 7/14/23 1533)   menthol (ICY HOT) 5 % patch 1 patch (1 patch Topical $Patch/Med Applied 7/14/23 1533)     And   menthol (ICY HOT) Patch in Place ( Transdermal Patch Free Period 7/14/23 8116)   ondansetron (ZOFRAN) injection 4 mg (4 mg Intravenous $Given 7/11/23 2342)   0.9% sodium chloride BOLUS (0 mLs Intravenous Stopped 7/12/23 0121)   dextrose 50 % injection 50 mL (50 mLs Intravenous $Given 7/12/23 0025)   0.9%  sodium chloride BOLUS (1,000 mLs Intravenous $New Bag 7/12/23 0135)   folic acid injection 1 mg (1 mg Intravenous $Given 7/12/23 0330)   folic acid injection 1 mg (1 mg Intravenous $Given 7/14/23 0947)   thiamine (B-1) 200 mg in sodium chloride 0.9 % 50 mL intermittent infusion (200 mg Intravenous $New Bag 7/13/23 2233)   gabapentin (NEURONTIN) tablet 1,200 mg (1,200 mg Oral $Given 7/12/23 0409)   dextrose 5% and 0.9% NaCl 1,000 mL with Infuvite Adult 10 mL, thiamine 100 mg, folic acid 1 mg infusion ( Intravenous $New Bag 7/12/23 0402)   calcium gluconate 1 g in 50 mL sodium chloride intermittent infusion (premix) (1 g Intravenous $Given 7/12/23 0644)   magnesium oxide (MAG-OX) tablet 400 mg (400 mg Oral $Given 7/12/23 1225)   potassium & sodium phosphates (NEUTRA-PHOS) Packet 1 packet (1 packet Oral or Feeding Tube $Given 7/12/23 1511)   0.9% sodium chloride BOLUS (1,000 mLs Intravenous $New Bag 7/12/23 1637)   magnesium oxide (MAG-OX) tablet 400 mg (400 mg Oral $Given 7/13/23 1016)   potassium & sodium phosphates (NEUTRA-PHOS) Packet 1 packet (1 packet Oral or Feeding Tube $Given 7/13/23 1409)   diazepam (VALIUM) tablet 5 mg (5 mg Oral $Given 7/14/23 1201)   magnesium oxide (MAG-OX) tablet 400 mg (400 mg Oral $Given 7/14/23 1903)   sodium phosphate 15 mmol in sodium chloride 0.9 % 250 mL intermittent infusion (15 mmol Intravenous $Given 7/14/23 1457)     Labs Ordered and Resulted from Time of ED Arrival to Time of ED Departure   COMPREHENSIVE METABOLIC PANEL - Abnormal       Result Value    Sodium 133 (*)     Potassium 4.7      Chloride 96 (*)     Carbon Dioxide (CO2) 7 (*)     Anion Gap 30 (*)     Urea Nitrogen 21.1 (*)     Creatinine 0.78      Calcium 8.3 (*)     Glucose 57 (*)     Alkaline Phosphatase 93      AST 67 (*)     ALT 59 (*)     Protein Total 6.7      Albumin 4.3      Bilirubin Total 0.2      GFR Estimate 88     LIPASE - Abnormal    Lipase 64 (*)    ETHYL ALCOHOL LEVEL - Abnormal    Alcohol ethyl 0.27  (*)    CBC WITH PLATELETS AND DIFFERENTIAL - Abnormal    WBC Count 11.6 (*)     RBC Count 5.04      Hemoglobin 15.5      Hematocrit 47.5 (*)     MCV 94      MCH 30.8      MCHC 32.6      RDW 13.6      Platelet Count 331     DIFFERENTIAL - Abnormal    % Neutrophils 88      % Lymphocytes 3      % Monocytes 3      % Eosinophils 0      % Basophils 0      % Metamyelocytes 4      % Myelocytes 2      Absolute Neutrophils 10.2 (*)     Absolute Lymphocytes 0.3 (*)     Absolute Monocytes 0.3      Absolute Eosinophils 0.0      Absolute Basophils 0.0      Absolute Metamyelocytes 0.5 (*)     Absolute Myelocytes 0.2 (*)     RBC Morphology Confirmed RBC Indices      Platelet Assessment        Value: Automated Count Confirmed. Platelet morphology is normal.   LACTIC ACID WHOLE BLOOD - Abnormal    Lactic Acid 2.7 (*)    KETONE BETA-HYDROXYBUTYRATE QUANTITATIVE, RAPID - Abnormal    Ketone (Beta-Hydroxybutyrate) Quantitative 8.10 (*)    OSMOLALITY - Abnormal    Osmolality Blood 348 (*)    ACETAMINOPHEN LEVEL - Abnormal    Acetaminophen <5.0 (*)    TSH WITH FREE T4 REFLEX - Abnormal    TSH 0.18 (*)    GLUCOSE BY METER - Abnormal    GLUCOSE BY METER POCT 161 (*)    MAGNESIUM - Normal    Magnesium 2.1     PHOSPHORUS - Normal    Phosphorus 3.8     SALICYLATE LEVEL - Normal    Salicylate <0.3     T4 FREE - Normal    Free T4 1.13     GLUCOSE MONITOR NURSING POCT     CT Head w/o Contrast   Final Result   IMPRESSION: No acute intracranial pathology.       I have personally reviewed the examination and initial interpretation   and I agree with the findings.      JUN NOGUERA MD            SYSTEM ID:  [id number]             Critical care was not performed.     Medical Decision Making  The patient's presentation was of high complexity (a chronic illness severe exacerbation, progression, or side effect of treatment).    The patient's evaluation involved:  ordering and/or review of 3+ test(s) in this encounter (see separate area of note for  details)    The patient's management necessitated high risk (a decision regarding hospitalization).      Assessment & Plan        I have reviewed the nursing notes. I have reviewed the findings, diagnosis, plan and need for follow up with the patient.    Patient acutely intoxicated with metabolic acidosis at this time will be obtaining remaining labs and evaluation I discussed the case with the DB from critical care patient will be admitted to the critical care service until she can be stabilized patient was signed out to night staff.    Final diagnoses:   Alcohol dependence with intoxication with complication (H)   Acidosis         [facility] EMERGENCY DEPARTMENT  7/11/2023     Kasi Fleming MD  07/14/23 4964

## 2023-07-12 NOTE — PROGRESS NOTES
"ICU SHIFT SUMMARY NOTE:For vital signs and complete assessments, please see documentation flowsheets.     Assessment   Afebrile,A&Ox4, able to make needs known, CIWA- ar score ranges from 5-11 , PRN diazepam administered as needed, NSR, BP WNLOn RA. Sats>92 Lung sounds CTA,  Regular diet , Ice chips ok. Voids spontenously,Grossly intact, 1 Lt. LF PIV, Ambulates to the bathroom without difficulties, denies any plans to harm herself nor denies thoughts of suicides    Major Shift Events:  CIWA scores within 5-11, Electrolytes replacement, trending up lactic acid  - patient denies any thoughts of harming herself , denies plan of harming others too.  -520 pm- Received a phone call from patient sister who states \" We are sure Kallie is suicidal, we are here at the hotel she checked in to clean supposedly  and found multiple bottles of liquor, multiple pills and some pills unlabelled from [place]\". I spoke to Patient daughter Anurag who brought the patient to the hospital states \" My mom called me and stating she is suicidal, and she checked into a hotel, that is where is found my mom and brought her to the hospital, I even called police for help\". After hearing all this statements , DB Jeromy was informed, She came to talk with the patient daughter.     Plan: Valium as needed for CIWA score          : Safety          : Seen by Psych           : referred for chem dep          : lactic acid monitoring    "

## 2023-07-12 NOTE — ED NOTES
Bed: UREDH-B  Expected date:   Expected time:   Means of arrival:   Comments:  [place] 58 female SI and EHOH

## 2023-07-12 NOTE — ED PROVIDER NOTES
Patient received as sign-out at change of shift.  Please see initial note for complete details.  58 year old female who presented to the ED with alcohol intoxication.  Found to be significantly acidotic.  Awaiting additional labs including lactate level, salicylate level, ketones, osmolality.  Acute events during this shift:        EKG Interpretation:      Interpreted by SILVESTRE GRIMES MD, MD  Time reviewed:0116   Symptoms at time of EKG: Tachycardia  Rhythm: Normal sinus  and Sinus tachycardia  Rate: 109  Axis: Normal  Ectopy: None  Conduction: Normal  ST Segments/ T Waves: No ST-T wave changes and No acute ischemic changes  Q Waves: None  Comparison to prior: No old EKG available    Clinical Impression: sinus tachycardia  .  Patient's lactate level elevated 2.7 but suspect it is alcohol related.  Her ketones are 8.1 consistent with alcoholic ketoacidosis.  Suspect that is the most likely cause for her metabolic derangement.  Patient's osmolality is 348.  Calculated osmolality without significant gap so do not suspect ingestion of methanol or ethylene glycol.  Patient's tachycardia significantly improved after IV fluids and atenolol.  Patient given D50 for hypoglycemia and also D5 normal saline banana bag.     Silvestre Grimes MD  07/12/23 015

## 2023-07-12 NOTE — CONSULTS
Met with pt for CD Consult and introduced self and role in pt's care.  Pt declined assessment for referral to CD treatment and any additional CD Resource needs.    This writer relayed to pt that if she changes her mind and would like to complete a CD Assessment while admitted she can alert unit staff who can assist in having CD Consult re-ordered.  If pt has active insurance she may also schedule an assessment on an OP basis by calling [facility] at [phone].    GORGE Griggs, [facility]  Chemical Dependency Evaluation Counselor  Email: [email or web address]

## 2023-07-12 NOTE — CONSULTS
Diagnostic Evaluation Consultation  Crisis Assessment    Patient was assessed: In Person  Patient location: declocations: [facility] Emergency Department  Was a release of information signed: Yes. Providers included on the release: Joseph, psychiatrist      Referral Data and Chief Complaint  Kallie is a 58 year old, who uses she/her pronouns, and presents to the ED via EMS. Patient is referred to the ED by family/friends. Patient is presenting to the ED for the following concerns: alcohol and suicidal ideation.    Informed Consent and Assessment Methods     Patient is her own guardian. Writer met with patient and explained the crisis assessment process, including applicable information disclosures and limits to confidentiality, assessed understanding of the process, and obtained consent to proceed with the assessment. Patient was observed to be able to participate in the assessment as evidenced by her agreement. Assessment methods included conducting a formal interview with patient, review of medical records, collaboration with medical staff, and obtaining relevant collateral information from family and community providers when available..     Over the course of this crisis assessment provided reassurance, offered validation and engaged patient in problem solving and disposition planning. Patient's response to interventions was positive.      Summary of Patient Situation    Patient came to the ED accompanied by her daughter. Medical notes as follow:  Patient's lactate level elevated 2.7 but suspect it is alcohol related.  Her ketones are 8.1 consistent with alcoholic ketoacidosis.  Suspect that is the most likely cause for her metabolic derangement . Patient is admitted to ICU.     Patient reports an impulsive binge drinking event for the past couple of days, and hx of frequent binge drinking episodes. She watched her oldest daughter having an alcohol drinking episode, and her presenting as if she was   schizophrenic . The patient feels guilty, saddened and helpless by her daughter s alcohol abuse. She is also recovering from an expensive ($700,000) and lengthy divorce after being  for 23 years. Patient is fearful of the empty nest stage now that her youngest is leaving for college.     Patient reportedly started binge drinking  a couple of days ago , and drank several bottles of wine. Patient felt triggered by her own daughter s alcohol abuse. Patient had been sober for approximately 10 years when she relapsed to a couple of months ago. Patient declines any referrals to substance abuse treatment nor mental health.    She also does not feel that current psychotropic medications are helpful. She reports worsening depression, frequent crying, sadness and helpless. She was diagnosed with Bi-polar D/O a month ago, and reports frequent  lows  and sudden, impulsive alcohol use. She denies suicidal wishes and plan.     Patient reportedly has been in individual therapy for four (4) years at [facility] and ongoing medication management      Brief Psychosocial History    Patient is , unemployed and lives by herself. 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 She had been involved in running school and community based clubs and organizations.   Patient attends [employer] but declines using the  for support. She has a great group of friends for support.       Significant Clinical History    Patient reports a current mental health diagnosis of Bi-polar disorder. Patient recently completed a psychological evaluation through [facility]. Patient has a hx of 4 years of individual therapy through [facility] (2010 - 2013).   Patient has a hx of 3 inpatient substance abuse treatment. Patient is unclear if detox or inpatient chemical dependency treatment. Patient was seen at [facility] ED on 5/16/2023 for alcohol intoxication.   Patient denies any hx of  mental health admissions.     Collateral Information     was unable to gather additional information. Patient provided contact information for her daughter Anurag Estrada @ [phone] and a call left was not returned.      Risk Assessment    [place] Suicide Severity Rating Scale Full Clinical Version: 7/12/2023   Suicidal Ideation  1. Wish to be Dead (Lifetime): No  2. Non-Specific Active Suicidal Thoughts (Lifetime): No     Suicidal Behavior  Actual Attempt (Lifetime): No  Has subject engaged in non-suicidal self-injurious behavior? (Lifetime): No  Interrupted Attempts (Lifetime): No  Aborted or Self-Interrupted Attempt (Lifetime): No  Preparatory Acts or Behavior (Lifetime): No  C-SSRS Risk (Lifetime/Recent)  Calculated C-SSRS Risk Score (Lifetime/Recent): No Risk Indicated    [place] Suicide Severity Rating Scale Since Last Contact: 7/12/2023     Validity of evaluation is impacted by presenting factors during interview: Patient reported during triage that she was suicidal. Patient currently denies suicidal ideation.   Comments regarding subjective versus objective responses to [place] tool: Patient made an effort to participate in the interview.   Environmental or Psychosocial Events: work or task failure, challenging interpersonal relationships, helplessness/hopelessness, impulsivity/recklessness, unemployment/underemployment and other life stressors  Chronic Risk Factors: serious, persistent mental illness, alcohol abuse   Warning Signs: seeking access to means to hurt or kill self, pain (new or worsening), rage, anger, seeking revenge, increasing substance use or abuse, anxiety, agitation, unable to sleep, sleeping all the time, dramatic changes in mood and recent losses (physical, financial, personal)  Protective Factors: strong bond to family unit, community support, or employment, lives in a responsibly safe and stable environment, good treatment engagement, able to access care without  barriers and supportive ongoing medical and mental health care relationships  Interpretation of Risk Scoring, Risk Mitigation Interventions and Safety Plan:  Patient's 3 daughters are identified as protective factors.     Does the patient have thoughts of harming others? No     Is the patient engaging in sexually inappropriate behavior?  no        Current Substance Abuse     Is there recent substance abuse? Alcohol      Was a urine drug screen or blood alcohol level obtained: Yes blood alcohol .27       Mental Status Exam     Affect: Appropriate   Appearance: Appropriate    Attention Span/Concentration: Attentive  Eye Contact: Engaged   Fund of Knowledge: Appropriate    Language /Speech Content: Fluent   Language /Speech Volume: Normal    Language /Speech Rate/Productions: Normal    Recent Memory: Intact   Remote Memory: Intact   Mood: Anxious and Depressed    Orientation to Person: Yes    Orientation to Place: Yes   Orientation to Time of Day: Yes    Orientation to Date: Yes    Situation (Do they understand why they are here?): Yes    Psychomotor Behavior: Normal    Thought Content: Clear   Thought Form: Intact      History of commitment: No       Medication    Psychotropic medications:   buPROPion (WELLBUTRIN XL) 150 MG 24 hr tablet  melatonin 5 MG tablet  diphenhydrAMINE-acetaminophen (TYLENOL PM)  MG tablet  Medication changes made in the last two weeks: No     Current Care Team    Primary Care Provider:  Joseph cox   Psychiatrist:  Joseph Martinez   Therapist:  No   :  No    Diagnosis    Adjustment Disorders  309.28 (F43.23) With mixed anxiety and depressed mood   296.33 (F33.2) Major Depressive Disorder, Recurrent Episode, Severe _ and With anxious distress - primary   Substance-Related & Addictive Disorders Alcohol Use Disorder   303.90 (F10.20) Severe The ICD-10-CM code indicates the comorbid presence of a moderate or severe substance use disorder, which must be present in ordre to  apply the code for substance wtihdrawal - primary     Clinical Summary and Substantiation of Recommendations    Patient presents with mood and substance abuse concerns. Patient's alcohol blood level upon arrival to the ED was .27. Patient identified multiple stressors including her own relapse to alcohol, her daughter's alcohol use, divorce after a marriage of 23 years and her youngest leaving for college.  Patient's mood symptoms have worsened despite taking her medications regularly. Patient denies suicidal wishes and plan.   Patient declines mental health and chemical dependency treatment.   Disposition    Recommended disposition:   Medical Admission: Alcohol intoxication      Reviewed case and recommendations with attending provider. Attending Name: Dr Silvestre Qureshi MD     Attending concurs with disposition: Yes       Patient and/or validated legal guardian concurs with disposition: Yes       Final disposition: . Medical Admission: Alcohol intoxication       Assessment Details    Patient interview started at: 1:45 AM and completed at: 2:45 AM     Total time spent with the patient or their family:  1.0 hrs     CPT code(s) utilized: 44678 - Psychotherapy for Crisis - 60 (30-74*) min       ZACHARY Vallecillo, [facility], Psychotherapist  DEC - Triage & Transition Services  Callback: [phone]

## 2023-07-12 NOTE — CONSULTS
"      Initial Psychiatric Consult   Consult date: July 12, 2023         Reason for Consult, requesting source:    Psych meds  Requesting source: ICU    This note is being entered to supplement the psychiatry consultation note that was completed on July 12, 2023 by the licensed mental health professional PENELOPE Marquez. They have reviewed with me the pertinent clinical details related to their encounter. I am being consulted to offer additional guidance on psychiatric pharmacological interventions.     Total time spent in chart review, patient interview and coordination of care; 60 minutes        HPI:   Kallie Estrada is a 58 year old female with PMH notable for anxiety, depression, ETOH dependence. Presented to [facility] ED 7/11/23 with acute ETOH intoxication seeking detox. Admitted to ICU given metabolic acidosis and VS derangements on arrival concerning for development of overt DTs.     Patient had full DEC assessment on 7/11 and follow-up [facility] visit 7/12 who helped set up outpatient therapy appointment. She tells me today that she feels like Wellbutrin hasn't been working at 150mg and at 300mg she had the \"stares.\" She started Lamictal in March and was most recently increased to 100mg. Her last dose of 100mg was 2 days ago. She would like to try Lexapro with her lamictal as her daughter and brother are on lexapro and it has worked very well for them.            Physical ROS:   The 10 point Review of Systems is negative other than noted in the HPI or here.         Medications:       cloNIDine  0.1 mg Oral Q8H     enoxaparin ANTICOAGULANT  40 mg Subcutaneous Q24H     [START ON 7/13/2023] escitalopram  5 mg Oral Daily     [START ON 7/15/2023] folic acid  1 mg Oral Daily     [START ON 7/13/2023] folic acid  1 mg Intravenous Daily     [START ON 7/19/2023] gabapentin  100 mg Oral Q8H     [START ON 7/17/2023] gabapentin  300 mg Oral Q8H     [START ON 7/15/2023] gabapentin  600 mg Oral Q8H     gabapentin  900 mg " "Oral Q8H     lamoTRIgine  100 mg Oral At Bedtime     magnesium oxide  400 mg Oral Q4H     melatonin  5 mg Oral QPM     multivitamin w/minerals  1 tablet Oral Daily     potassium & sodium phosphates  1 packet Oral or Feeding Tube Q4H     thiamine  200 mg Intravenous TID     [START ON 7/14/2023] thiamine  100 mg Oral TID     [START ON 7/19/2023] thiamine  100 mg Oral Daily            Physical and Psychiatric Examination:     /69   Pulse 81   Temp 98.2  F (36.8  C) (Oral)   Resp 18   Ht 1.6 m (5' 3\")   Wt 57 kg (125 lb 10.6 oz)   SpO2 99%   BMI 22.26 kg/m    Weight is 125 lbs 10.6 oz  Body mass index is 22.26 kg/m .    Physical Exam:  I have reviewed the physical exam as documented by by the medical team and agree with findings and assessment and have no additional findings to add at this time.    Mental Status Exam:  Appearance: awake, alert and tearful  Attitude:  cooperative  Eye Contact:  good  Mood:  anxious and sad   Affect:  appropriate and in normal range and mood congruent  Speech:  clear, coherent  Psychomotor Behavior:  no evidence of tardive dyskinesia, dystonia, or tics  Muscle strength and tone: baseline   Throught Process:  logical, linear and goal oriented  Associations:  no loose associations  Thought Content:  no evidence of suicidal ideation or homicidal ideation and no evidence of psychotic thought  Insight:  fair  Judgement:  fair  Oriented to:  time, person, and place  Attention Span and Concentration:  intact  Recent and Remote Memory:  intact  Language: able to name/identify objects without impairment  Fund of Knowledge: intact with awareness of current and past events             DSM-5 Diagnosis:   Alcohol use disorder, unspecified   Recent diagnosis of bipolar 2 disorder          Assessment:   Kallie is a 58 year old female with a history of AUD and recent diagnosis of bipolar 2 disorder who presents for etoh detox. She has been on Lamictal for the last few months to help with mood " stabilization. Since her last dose was 2 days ago, we can restart at same dose. We discussed side effects and uses of Lamictal including risk for SJS. She agreed to discontinuing wellbtrin as ineffective and opted to trial lexapro as multiple family members have done well on it. Discussed r/b/a of all and pt agreed. Strongly recommended increased frequency of therapy and chemical dependency treatment as well.           Summary of Recommendations:     1. Restarted Lamictal 100mg at bedtime  2. Discontinued Wellbutrin 150mg daily  3. Start Lexapro 5mg daily tomorrow    She should follow-up with her outpatient providers shortly after discharge and I placed referral for psychological testing at the  per pt request.       Laura Urrutia, PMJEROMEP-BC  Consult/Liaison Psychiatry   [facility]

## 2023-07-13 LAB
ALBUMIN SERPL BCG-MCNC: 3 G/DL (ref 3.5–5.2)
ALP SERPL-CCNC: 58 U/L (ref 35–104)
ALT SERPL W P-5'-P-CCNC: 40 U/L (ref 0–50)
AMPHETAMINES UR QL SCN: ABNORMAL
ANION GAP SERPL CALCULATED.3IONS-SCNC: 7 MMOL/L (ref 7–15)
AST SERPL W P-5'-P-CCNC: 37 U/L (ref 0–45)
BARBITURATES UR QL SCN: ABNORMAL
BENZODIAZ UR QL SCN: ABNORMAL
BILIRUB SERPL-MCNC: 0.6 MG/DL
BUN SERPL-MCNC: 16.1 MG/DL (ref 6–20)
BZE UR QL SCN: ABNORMAL
CALCIUM SERPL-MCNC: 7.9 MG/DL (ref 8.6–10)
CANNABINOIDS UR QL SCN: ABNORMAL
CHLORIDE SERPL-SCNC: 109 MMOL/L (ref 98–107)
CREAT SERPL-MCNC: 0.71 MG/DL (ref 0.51–0.95)
DEPRECATED HCO3 PLAS-SCNC: 19 MMOL/L (ref 22–29)
ERYTHROCYTE [DISTWIDTH] IN BLOOD BY AUTOMATED COUNT: 14.4 % (ref 10–15)
ETHANOL UR QL SCN: ABNORMAL
GFR SERPL CREATININE-BSD FRML MDRD: >90 ML/MIN/1.73M2
GLUCOSE BLDC GLUCOMTR-MCNC: 101 MG/DL (ref 70–99)
GLUCOSE SERPL-MCNC: 97 MG/DL (ref 70–99)
HCT VFR BLD AUTO: 32.6 % (ref 35–47)
HGB BLD-MCNC: 10.7 G/DL (ref 11.7–15.7)
MAGNESIUM SERPL-MCNC: 1.9 MG/DL (ref 1.7–2.3)
MCH RBC QN AUTO: 30.7 PG (ref 26.5–33)
MCHC RBC AUTO-ENTMCNC: 32.8 G/DL (ref 31.5–36.5)
MCV RBC AUTO: 93 FL (ref 78–100)
OPIATES UR QL SCN: ABNORMAL
PHOSPHATE SERPL-MCNC: 1.1 MG/DL (ref 2.5–4.5)
PLATELET # BLD AUTO: 167 10E3/UL (ref 150–450)
POTASSIUM SERPL-SCNC: 4.1 MMOL/L (ref 3.4–5.3)
PROT SERPL-MCNC: 4.6 G/DL (ref 6.4–8.3)
RBC # BLD AUTO: 3.49 10E6/UL (ref 3.8–5.2)
SODIUM SERPL-SCNC: 135 MMOL/L (ref 136–145)
WBC # BLD AUTO: 6.3 10E3/UL (ref 4–11)

## 2023-07-13 PROCEDURE — 99232 SBSQ HOSP IP/OBS MODERATE 35: CPT

## 2023-07-13 PROCEDURE — 250N000013 HC RX MED GY IP 250 OP 250 PS 637

## 2023-07-13 PROCEDURE — 84100 ASSAY OF PHOSPHORUS: CPT | Performed by: INTERNAL MEDICINE

## 2023-07-13 PROCEDURE — 80307 DRUG TEST PRSMV CHEM ANLYZR: CPT | Performed by: FAMILY MEDICINE

## 2023-07-13 PROCEDURE — 36415 COLL VENOUS BLD VENIPUNCTURE: CPT

## 2023-07-13 PROCEDURE — 120N000002 HC R&B MED SURG/OB UMMC

## 2023-07-13 PROCEDURE — 258N000003 HC RX IP 258 OP 636: Performed by: NURSE PRACTITIONER

## 2023-07-13 PROCEDURE — 80053 COMPREHEN METABOLIC PANEL: CPT

## 2023-07-13 PROCEDURE — 83735 ASSAY OF MAGNESIUM: CPT | Performed by: INTERNAL MEDICINE

## 2023-07-13 PROCEDURE — 250N000013 HC RX MED GY IP 250 OP 250 PS 637: Performed by: NURSE PRACTITIONER

## 2023-07-13 PROCEDURE — 250N000011 HC RX IP 250 OP 636: Performed by: NURSE PRACTITIONER

## 2023-07-13 PROCEDURE — 85027 COMPLETE CBC AUTOMATED: CPT

## 2023-07-13 RX ORDER — MAGNESIUM OXIDE 400 MG/1
400 TABLET ORAL EVERY 4 HOURS
Status: COMPLETED | OUTPATIENT
Start: 2023-07-13 | End: 2023-07-13

## 2023-07-13 RX ADMIN — CLONIDINE HYDROCHLORIDE 0.1 MG: 0.1 TABLET ORAL at 05:06

## 2023-07-13 RX ADMIN — ESCITALOPRAM OXALATE 5 MG: 5 TABLET, FILM COATED ORAL at 08:40

## 2023-07-13 RX ADMIN — ESTRADIOL 1 MG: 1 TABLET ORAL at 08:40

## 2023-07-13 RX ADMIN — LAMOTRIGINE 100 MG: 100 TABLET ORAL at 20:33

## 2023-07-13 RX ADMIN — MAGNESIUM OXIDE TAB 400 MG (241.3 MG ELEMENTAL MG) 400 MG: 400 (241.3 MG) TAB at 06:49

## 2023-07-13 RX ADMIN — DIAZEPAM 10 MG: 10 TABLET ORAL at 17:48

## 2023-07-13 RX ADMIN — ACETAMINOPHEN 650 MG: 325 TABLET, FILM COATED ORAL at 08:40

## 2023-07-13 RX ADMIN — CLONIDINE HYDROCHLORIDE 0.1 MG: 0.1 TABLET ORAL at 20:34

## 2023-07-13 RX ADMIN — MAGNESIUM OXIDE TAB 400 MG (241.3 MG ELEMENTAL MG) 400 MG: 400 (241.3 MG) TAB at 10:16

## 2023-07-13 RX ADMIN — MEDROXYPROGESTERONE ACETATE 5 MG: 5 TABLET ORAL at 08:40

## 2023-07-13 RX ADMIN — CLONIDINE HYDROCHLORIDE 0.1 MG: 0.1 TABLET ORAL at 14:09

## 2023-07-13 RX ADMIN — DIAZEPAM 10 MG: 10 TABLET ORAL at 10:17

## 2023-07-13 RX ADMIN — POTASSIUM & SODIUM PHOSPHATES POWDER PACK 280-160-250 MG 1 PACKET: 280-160-250 PACK at 10:16

## 2023-07-13 RX ADMIN — POTASSIUM & SODIUM PHOSPHATES POWDER PACK 280-160-250 MG 1 PACKET: 280-160-250 PACK at 14:09

## 2023-07-13 RX ADMIN — FOLIC ACID 1 MG: 5 INJECTION, SOLUTION INTRAMUSCULAR; INTRAVENOUS; SUBCUTANEOUS at 08:44

## 2023-07-13 RX ADMIN — GABAPENTIN 900 MG: 300 CAPSULE ORAL at 20:34

## 2023-07-13 RX ADMIN — Medication 5 MG: at 20:33

## 2023-07-13 RX ADMIN — THIAMINE HYDROCHLORIDE 200 MG: 100 INJECTION, SOLUTION INTRAMUSCULAR; INTRAVENOUS at 22:33

## 2023-07-13 RX ADMIN — MULTIPLE VITAMINS W/ MINERALS TAB 1 TABLET: TAB at 08:40

## 2023-07-13 RX ADMIN — GABAPENTIN 900 MG: 300 CAPSULE ORAL at 14:08

## 2023-07-13 RX ADMIN — DIAZEPAM 10 MG: 10 TABLET ORAL at 23:24

## 2023-07-13 RX ADMIN — GABAPENTIN 900 MG: 300 CAPSULE ORAL at 05:06

## 2023-07-13 RX ADMIN — POTASSIUM & SODIUM PHOSPHATES POWDER PACK 280-160-250 MG 1 PACKET: 280-160-250 PACK at 06:49

## 2023-07-13 RX ADMIN — THIAMINE HYDROCHLORIDE 200 MG: 100 INJECTION, SOLUTION INTRAMUSCULAR; INTRAVENOUS at 16:39

## 2023-07-13 RX ADMIN — THIAMINE HYDROCHLORIDE 200 MG: 100 INJECTION, SOLUTION INTRAMUSCULAR; INTRAVENOUS at 08:44

## 2023-07-13 ASSESSMENT — ACTIVITIES OF DAILY LIVING (ADL)
ADLS_ACUITY_SCORE: 22

## 2023-07-13 NOTE — PROGRESS NOTES
Major Shift Events:    CIWA score 5, Phos and Mg replaced, lactic acid 1.5 and glucose <150x2.  Pt denies suicidal ideation.    See documentation flowsheets for assessments.

## 2023-07-13 NOTE — PROGRESS NOTES
EMR reviewed.   Patient transferred from ICU. Staffed by ICU team today. See ICU team note for the details.   Hospital medicine to assume care, staff starting 7/14.   Please page with any Q.     Vitals:    07/13/23 0000 07/13/23 0700 07/13/23 0800 07/13/23 1100   BP: 100/55 (!) 87/54 102/57 122/58   BP Location:   Left arm Right arm   Patient Position:    Sitting   Cuff Size:    Adult Regular   Pulse: 90 81 80 96   Resp: 20 19 18 20   Temp:   97.6  F (36.4  C)    TempSrc:   Axillary    SpO2: 98% 96% 97% 98%   Weight:       Height:           Renny Gibbs MD  [facility]  Contact information available via [facility] Paging/Directory

## 2023-07-13 NOTE — PROGRESS NOTES
"SPIRITUAL HEALTH SERVICES Progress Note  [facility]    Saw pt Kallie Estrada per staff recommendation.    Patient/Family Understanding of Illness and Goals of Care - pt believes she is not currently dying and will get better enough to go home soon.    Distress and Loss - pt has had to talk to a lot of people and does not want to share her story anymore.    Strengths, Coping, and Resources  - pt knows that there are a lot of hospital staff working hard to help her.    Meaning, Beliefs, and Spirituality - pt said, \"I use to go to [employer]. I have support from there.\" And \"I look forward to going back to [place].\"    Plan of Care - pt knows she can ask her nurse for a  visit at anytime. Follow up visit NOT need, unless if pt requests one.    Jerzy Eason III   Intern  Pager [phone]    * [facility] remains available 24/7 for emergent requests/referrals, either by having the switchboard page the on-call  or by entering an ASAP/STAT consult in Epic (this will also page the on-call ). Routine Epic consults receive an initial response within 24 hours.*   "

## 2023-07-13 NOTE — PLAN OF CARE
"/58 (BP Location: Right arm, Patient Position: Sitting, Cuff Size: Adult Regular)   Pulse 96   Temp 97.6  F (36.4  C) (Axillary)   Resp 20   Ht 1.6 m (5' 3\")   Wt 57 kg (125 lb 10.6 oz)   SpO2 98%   BMI 22.26 kg/m    Pt arrived on unit at 1130 with personal belongings via staff via wheel chair, pt is alert and oriented x4, able to make needs known, tearful and anxious. Reassured and validated pt. Family in to visit and do pt's hair and assist with shower, PIV left arm SL, Pt requested no blood pressures on right arm d/t bruising, gonzalo food and fluid, went for a walk in the hallway, denies pain, SOB, CIWA score 11 mostly for anxiety and head fullness. Pt cont to be tearful, wondering when she can go home. Provider paged to discharge tele order. disposition TBD, pt states she is considering inpatient treatment in a month when her daughter goes to college. Continue plan of care       "

## 2023-07-13 NOTE — PROGRESS NOTES
"  [facility] ICU PROGRESS NOTE  07/13/2023      Date of Service (when I saw the patient): 07/13/2023    ASSESSMENT: Kallie Estrada is a 58 year old female with PMH notable for anxiety, depression, ETOH dependence. Presented to [facility] ED 7/11/23 with acute ETOH intoxication seeking detox. Admitted to ICU given metabolic acidosis and VS derangements on arrival concerning for development of overt DTs.     CHANGES AND PLAN:    - Transfer out of ICU    Neuro:  # Acute ETOH intoxication  # ETOH withdrawal  Endorses a binge drinking pattern up to 3 bottles of wine/day, last drink 7/11/23. Presented to ED 7/11 with ETOH 0.27. Seeking detox currently. High risk of complicated withdrawal. No hx of ETOH withdrawal seizures. Patient reports has been sober for a number of years in the past and relapsed in May 2023 2/2 various life stressors.  ? ETOH in ED 0.27, UDS negative  ? CIWA protocol  - Diazepam PRN  - Gabapentin taper  - Haloperidol PRN [EKG without QT/QTc prolongation]  - Scheduled clonidine  ? Seizure precautions  ? Thiamine and folate supplements   ? Denies suicidal ideation  ? Chem dep consult, pt agreeable to be seen     # Depression  # Anxiety  # Bipolar 2 disorder  Per discussion with patient's daughter, patient was endorsing suicidal ideation without plan the night she was brought to the ED. Patient's daughter reports she received a call from her mom stating \"I want to die but I don't know how\". Patient currently denying any suicidal ideation.  ? Psychiatry consult    Resume PTA Lamictal 100 mg PO nightly    Start Lexapro 5 mg daily beginning 7/13    Discontinue Wellbutrin     Pulmonary:  No acute concerns  ? Supplemental O2 to keep SPO2 > 92%     Cardiovascular:  # Tachycardia, likely 2/2 dehydration and withdrawal, resolved  ? VS and tele per ICU routine   ? Goal HR < 130, goal MAP > 65 mmHg      GI/Nutrition:  # Elevated transaminases 2/2 ETOH use  # Elevated lipase, resolved   # High risk for re-feeding " syndrome   ? Regular diet   ? LFTs now WNL  ? Lipase 64 --> 62  ? INR WNL  ? PRN bowel regimen   ? PRN Zofran for nausea     Renal/Fluids/Electrolytes:  # AGMA 2/2 alcoholic ketoacidosis and mild lactic acidosis, resolving   # Mild hyponatremia, likely 2/2 low solute intake, improving   ? Lactic acid, serum ketones downtrending  ? VBG with metabolic acidosis, anion gap improved  ? Discontinue D5 0.9 @ 100 mL/hr  ? High intensity electrolyte replacement protocol  ? Monitor fluid balance with I&O and daily weights, no singh needed     Endocrine:  # Hypoglycemia, improved  ? Monitor BG q4h  ? TSH 0.18, free T4 1.13      ID:  # Leukocytosis, improving  ? U/UC ordered   ? Monitor fever curve, consider infectious work-up if patient if fevering     Hematology:    # No acute concerns  ? Daily CBC  ? PCDs  ? Discontinue Lovenox     Musculoskeletal:  # Risk for deconditioning  ? Ambulatory PTA, will hold off on PT/OT for now, consider if pt has limited activity during acute phase of detox       Skin:  No acute concerns  ? Diligent skin cares to prevent breakdown     General Cares/Prophylaxis:    DVT Prophylaxis: Pneumatic Compression Devices  GI Prophylaxis: Not indicated  Restraints: Not indicated   Family Communication: Pt updated  Code Status: Full     Lines/tubes/drains:  - PIVs     Disposition:  ? Tx out of ICU    Patient seen and findings/plan discussed with medical ICU staff, Dr. Danielito Wilson, [facility]          I spent 40 minutes of critical care time with patient.       ====================================  INTERVAL HISTORY:   VSS. No acute events overnight.     OBJECTIVE:   1. VITAL SIGNS:   Temp:  [98.1  F (36.7  C)-98.2  F (36.8  C)] 98.1  F (36.7  C)  Pulse:  [81-96] 90  Resp:  [14-26] 20  BP: ()/(48-82) 100/55  SpO2:  [96 %-100 %] 98 %  Resp: 20    2. INTAKE/ OUTPUT:   I/O last 3 completed shifts:  In: 1050 [I.V.:1050]  Out: 901 [Urine:901]    3. PHYSICAL EXAMINATION:    General: Awake and alert,  orientated x 4, NAD.   HEENT: Normocephalic, atraumatic. Sclera non-icteric. Mucous membranes moist, intact.   Neuro: A&Ox4. Equal strength in all extremities.   Pulm/Resp: Clear breath sounds bilaterally without rhonchi, crackles or wheeze, breathing non-labored. On room air.   CV: RRR, S1S2, no murmur, rub, or gallop. No edema.   Abdomen: Soft, non-distended, non-tender.   : Voiding spontaneously without difficulty.   Incisions/Skin: Warm, dry, intact. No rashes or lesions.     4. LABS:   Arterial Blood Gases   No lab results found in last 7 days.  Complete Blood Count   Recent Labs   Lab 07/13/23 0424 07/12/23  0539 07/12/23  0400 07/11/23  2259   WBC 6.3 10.4 9.7 11.6*   HGB 10.7* 11.8 11.3* 15.5    222 227 331     Basic Metabolic Panel  Recent Labs   Lab 07/13/23 0424 07/12/23 2007 07/12/23  1231 07/12/23  1148 07/12/23  0752 07/12/23  0539   * 136  --  134*  --  132*   POTASSIUM 4.1 4.2  --  4.4  --  4.4   CHLORIDE 109* 109*  --  107  --  105   CO2 19* 19*  --  16*  --  10*   BUN 16.1 16.2  --  16.3  --  16.6   CR 0.71 0.79  --  0.75  --  0.68   GLC 97 137* 191* 244*   < > 248*    < > = values in this interval not displayed.     Liver Function Tests  Recent Labs   Lab 07/13/23 0424 07/12/23 0539 07/12/23  0359 07/11/23  2343   AST 37 44 42 67*   ALT 40 43 39 59*   ALKPHOS 58 64 58 93   BILITOTAL 0.6 0.3 0.2 0.2   ALBUMIN 3.0* 3.3* 3.0* 4.3   INR  --   --  1.10  --      Coagulation Profile  Recent Labs   Lab 07/12/23  0359   INR 1.10       5. RADIOLOGY:   No results found for this or any previous visit (from the past 24 hour(s)).

## 2023-07-14 ENCOUNTER — APPOINTMENT (OUTPATIENT)
Dept: CT IMAGING | Facility: CLINIC | Age: 58
End: 2023-07-14
Attending: INTERNAL MEDICINE
Payer: COMMERCIAL

## 2023-07-14 LAB
ALBUMIN SERPL BCG-MCNC: 3.1 G/DL (ref 3.5–5.2)
ALP SERPL-CCNC: 73 U/L (ref 35–104)
ALT SERPL W P-5'-P-CCNC: 35 U/L (ref 0–50)
ANION GAP SERPL CALCULATED.3IONS-SCNC: 7 MMOL/L (ref 7–15)
AST SERPL W P-5'-P-CCNC: 33 U/L (ref 0–45)
BACTERIA UR CULT: NORMAL
BILIRUB SERPL-MCNC: 0.5 MG/DL
BUN SERPL-MCNC: 10.2 MG/DL (ref 6–20)
CALCIUM SERPL-MCNC: 7.9 MG/DL (ref 8.6–10)
CHLORIDE SERPL-SCNC: 108 MMOL/L (ref 98–107)
CREAT SERPL-MCNC: 0.79 MG/DL (ref 0.51–0.95)
DEPRECATED HCO3 PLAS-SCNC: 25 MMOL/L (ref 22–29)
ERYTHROCYTE [DISTWIDTH] IN BLOOD BY AUTOMATED COUNT: 14 % (ref 10–15)
GFR SERPL CREATININE-BSD FRML MDRD: 86 ML/MIN/1.73M2
GLUCOSE SERPL-MCNC: 108 MG/DL (ref 70–99)
HCT VFR BLD AUTO: 32.9 % (ref 35–47)
HGB BLD-MCNC: 10.2 G/DL (ref 11.7–15.7)
MAGNESIUM SERPL-MCNC: 1.9 MG/DL (ref 1.7–2.3)
MCH RBC QN AUTO: 29.7 PG (ref 26.5–33)
MCHC RBC AUTO-ENTMCNC: 31 G/DL (ref 31.5–36.5)
MCV RBC AUTO: 96 FL (ref 78–100)
PHOSPHATE SERPL-MCNC: 1.5 MG/DL (ref 2.5–4.5)
PHOSPHATE SERPL-MCNC: 2.1 MG/DL (ref 2.5–4.5)
PLATELET # BLD AUTO: 147 10E3/UL (ref 150–450)
POTASSIUM SERPL-SCNC: 4.2 MMOL/L (ref 3.4–5.3)
PROT SERPL-MCNC: 4.7 G/DL (ref 6.4–8.3)
RBC # BLD AUTO: 3.43 10E6/UL (ref 3.8–5.2)
SODIUM SERPL-SCNC: 140 MMOL/L (ref 136–145)
WBC # BLD AUTO: 5.4 10E3/UL (ref 4–11)

## 2023-07-14 PROCEDURE — 250N000013 HC RX MED GY IP 250 OP 250 PS 637: Performed by: NURSE PRACTITIONER

## 2023-07-14 PROCEDURE — 250N000013 HC RX MED GY IP 250 OP 250 PS 637

## 2023-07-14 PROCEDURE — 70450 CT HEAD/BRAIN W/O DYE: CPT | Mod: 26 | Performed by: RADIOLOGY

## 2023-07-14 PROCEDURE — 36415 COLL VENOUS BLD VENIPUNCTURE: CPT | Performed by: INTERNAL MEDICINE

## 2023-07-14 PROCEDURE — 85027 COMPLETE CBC AUTOMATED: CPT

## 2023-07-14 PROCEDURE — 250N000013 HC RX MED GY IP 250 OP 250 PS 637: Performed by: INTERNAL MEDICINE

## 2023-07-14 PROCEDURE — 82374 ASSAY BLOOD CARBON DIOXIDE: CPT

## 2023-07-14 PROCEDURE — 36415 COLL VENOUS BLD VENIPUNCTURE: CPT

## 2023-07-14 PROCEDURE — 84100 ASSAY OF PHOSPHORUS: CPT | Performed by: INTERNAL MEDICINE

## 2023-07-14 PROCEDURE — 250N000011 HC RX IP 250 OP 636: Performed by: NURSE PRACTITIONER

## 2023-07-14 PROCEDURE — 84100 ASSAY OF PHOSPHORUS: CPT | Performed by: PEDIATRICS

## 2023-07-14 PROCEDURE — 83735 ASSAY OF MAGNESIUM: CPT | Performed by: PEDIATRICS

## 2023-07-14 PROCEDURE — 70450 CT HEAD/BRAIN W/O DYE: CPT

## 2023-07-14 PROCEDURE — 120N000002 HC R&B MED SURG/OB UMMC

## 2023-07-14 PROCEDURE — 99232 SBSQ HOSP IP/OBS MODERATE 35: CPT | Performed by: INTERNAL MEDICINE

## 2023-07-14 PROCEDURE — 250N000009 HC RX 250: Performed by: INTERNAL MEDICINE

## 2023-07-14 PROCEDURE — 258N000003 HC RX IP 258 OP 636: Performed by: INTERNAL MEDICINE

## 2023-07-14 RX ORDER — CLONIDINE HYDROCHLORIDE 0.1 MG/1
0.1 TABLET ORAL 2 TIMES DAILY
Status: DISCONTINUED | OUTPATIENT
Start: 2023-07-14 | End: 2023-07-15 | Stop reason: HOSPADM

## 2023-07-14 RX ORDER — HYDROXYZINE HYDROCHLORIDE 25 MG/1
50 TABLET, FILM COATED ORAL 3 TIMES DAILY PRN
Status: DISCONTINUED | OUTPATIENT
Start: 2023-07-14 | End: 2023-07-15 | Stop reason: HOSPADM

## 2023-07-14 RX ORDER — DIAZEPAM 5 MG
5 TABLET ORAL EVERY 6 HOURS PRN
Status: DISCONTINUED | OUTPATIENT
Start: 2023-07-14 | End: 2023-07-15 | Stop reason: HOSPADM

## 2023-07-14 RX ORDER — MAGNESIUM OXIDE 400 MG/1
400 TABLET ORAL EVERY 4 HOURS
Status: COMPLETED | OUTPATIENT
Start: 2023-07-14 | End: 2023-07-14

## 2023-07-14 RX ORDER — DIAZEPAM 5 MG
5 TABLET ORAL ONCE
Status: COMPLETED | OUTPATIENT
Start: 2023-07-14 | End: 2023-07-14

## 2023-07-14 RX ADMIN — MAGNESIUM HYDROXIDE 30 ML: 400 SUSPENSION ORAL at 22:12

## 2023-07-14 RX ADMIN — GABAPENTIN 900 MG: 300 CAPSULE ORAL at 20:25

## 2023-07-14 RX ADMIN — SODIUM PHOSPHATE, MONOBASIC, MONOHYDRATE AND SODIUM PHOSPHATE, DIBASIC, ANHYDROUS 15 MMOL: 276; 142 INJECTION, SOLUTION INTRAVENOUS at 14:57

## 2023-07-14 RX ADMIN — THIAMINE HCL TAB 100 MG 100 MG: 100 TAB at 15:02

## 2023-07-14 RX ADMIN — MENTHOL 1 PATCH: 205.5 PATCH TOPICAL at 15:33

## 2023-07-14 RX ADMIN — HYDROXYZINE HYDROCHLORIDE 50 MG: 25 TABLET, FILM COATED ORAL at 15:33

## 2023-07-14 RX ADMIN — GABAPENTIN 900 MG: 300 CAPSULE ORAL at 12:35

## 2023-07-14 RX ADMIN — Medication 5 MG: at 20:27

## 2023-07-14 RX ADMIN — CLONIDINE HYDROCHLORIDE 0.1 MG: 0.1 TABLET ORAL at 20:26

## 2023-07-14 RX ADMIN — ESCITALOPRAM OXALATE 5 MG: 5 TABLET, FILM COATED ORAL at 09:16

## 2023-07-14 RX ADMIN — DIAZEPAM 5 MG: 5 TABLET ORAL at 12:01

## 2023-07-14 RX ADMIN — THIAMINE HCL TAB 100 MG 100 MG: 100 TAB at 20:32

## 2023-07-14 RX ADMIN — ESTRADIOL 1 MG: 1 TABLET ORAL at 09:15

## 2023-07-14 RX ADMIN — MAGNESIUM OXIDE TAB 400 MG (241.3 MG ELEMENTAL MG) 400 MG: 400 (241.3 MG) TAB at 15:02

## 2023-07-14 RX ADMIN — SENNOSIDES AND DOCUSATE SODIUM 2 TABLET: 50; 8.6 TABLET ORAL at 20:32

## 2023-07-14 RX ADMIN — GABAPENTIN 900 MG: 300 CAPSULE ORAL at 05:05

## 2023-07-14 RX ADMIN — MULTIPLE VITAMINS W/ MINERALS TAB 1 TABLET: TAB at 09:17

## 2023-07-14 RX ADMIN — THIAMINE HCL TAB 100 MG 100 MG: 100 TAB at 09:17

## 2023-07-14 RX ADMIN — LAMOTRIGINE 100 MG: 100 TABLET ORAL at 20:27

## 2023-07-14 RX ADMIN — MEDROXYPROGESTERONE ACETATE 5 MG: 5 TABLET ORAL at 09:45

## 2023-07-14 RX ADMIN — MAGNESIUM OXIDE TAB 400 MG (241.3 MG ELEMENTAL MG) 400 MG: 400 (241.3 MG) TAB at 19:03

## 2023-07-14 RX ADMIN — FOLIC ACID 1 MG: 5 INJECTION, SOLUTION INTRAMUSCULAR; INTRAVENOUS; SUBCUTANEOUS at 09:47

## 2023-07-14 RX ADMIN — CLONIDINE HYDROCHLORIDE 0.1 MG: 0.1 TABLET ORAL at 05:05

## 2023-07-14 ASSESSMENT — ACTIVITIES OF DAILY LIVING (ADL)
ADLS_ACUITY_SCORE: 22

## 2023-07-14 NOTE — PROGRESS NOTES
[facility]    Medicine Progress Note - Hospitalist Service, GOLD TEAM 16    Date of Admission:  7/11/2023    Assessment & Plan     Kallie Estrada is a 58 year old female with PMH notable for anxiety, depression, ETOH dependence. Presented to [facility] ED 7/11/23 with acute ETOH intoxication seeking detox. Admitted to ICU given metabolic acidosis and VS derangements on arrival concerning for development of overt DTs. Improved quickly, transferred to medical floor 7/13.       # Alcohol use disorder, severe  # Alcohol intoxication, withdrawal.   Endorses a binge drinking pattern up to 3 bottles of wine/day, last drink 7/11/23. Presented to ED 7/11 with ETOH 0.27. Seeking detox currently. High risk of complicated withdrawal. No hx of ETOH withdrawal seizures. Patient reports has been sober for a number of years in the past and relapsed in May 2023 2/2 various life stressors.  ? ETOH in ED 0.27, UDS negative  ? CIWA protocol  - Diazepam PRN  - Gabapentin taper  - Haloperidol PRN [EKG without QT/QTc prolongation]  - Scheduled clonidine  ? Seizure precautions  ? Thiamine and folate supplements   ? Denies suicidal ideation  ? Chem dep consult,    #Depression  # Anxiety  # Bipolar 2 disorder  Patient currently denying any suicidal ideation.  ? Psychiatry consult    Resume PTA Lamictal 100 mg PO nightly    Start Lexapro 5 mg daily beginning 7/13    Discontinue Wellbutrin.    Hydroxyzine prn.           # Elevated transaminases 2/2 ETOH use  # Elevated lipase, resolved   # High risk for re-feeding syndrome   ? Regular diet   ? LFTs now WNL  ? Lipase 64 --> 62  ? INR WNL  ? PRN bowel regimen   ? PRN Zofran for nausea    # AGMA 2/2 alcoholic ketoacidosis and mild lactic acidosis, resolving   # Mild hyponatremia, likely 2/2 low solute intake, improving   ? Lactic acid, serum ketones downtrending  ? VBG with metabolic acidosis, anion gap improved  ? Discontinue D5 0.9 @ 100  mL/hr  ? High intensity electrolyte replacement protocol  ? Monitor fluid balance with I&O and daily weights, no singh needed    # Headache: L sided. Chronic. hx of fall. Non focal. CT head per pt request. outpt follow-up.     Diet: Regular Diet Adult    DVT Prophylaxis: Pneumatic Compression Devices  Singh Catheter: Not present  Lines: None     Cardiac Monitoring: None  Code Status: Full Code      Clinically Significant Risk Factors              # Hypoalbuminemia: Lowest albumin = 3 g/dL at 7/13/2023  4:24 AM, will monitor as appropriate                     Disposition Plan     Expected Discharge Date: 07/18/2023      Destination: home            Renny Gibbs MD  Hospitalist Service, GOLD TEAM 16  [facility]  Securely message with [employer] (more info)  Text page via [employer] Paging/Directory   See signed in provider for up to date coverage information  ______________________________________________________________________    Interval History   Alcohol withdrawal  Very anxious  L sided headache- not new  No fever or cough or cp or sob  Denies SI      Physical Exam   Vital Signs: Temp: 98  F (36.7  C) Temp src: Oral BP: 113/61 Pulse: 96   Resp: 20 SpO2: 97 % O2 Device: None (Room air)    Weight: 125 lbs 10.6 oz      General Appearance: Awake, interactive, NAD, anxious  Respiratory: Normal work of breathing.  Cardiovascular: RRR  GI: Soft. NT. ND.  Extremities: Distally wwp. No pedal edema  Skin: No acute rash on exposed areas.   Neuro: Grossly non focal.   Others: Stable mood.       Medical Decision Making       35 MINUTES SPENT BY ME on the date of service doing chart review, history, exam, documentation & further activities per the note.      Data     I have personally reviewed the following data over the past 24 hrs:    5.4  \   10.2 (L)   / 147 (L)     140 108 (H) 10.2 /  108 (H)   4.2 25 0.79 \       ALT: 35 AST: 33 AP: 73 TBILI: 0.5   ALB: 3.1 (L) TOT PROTEIN: 4.7 (L)  LIPASE: N/A       Imaging results reviewed over the past 24 hrs:   No results found for this or any previous visit (from the past 24 hour(s)).

## 2023-07-14 NOTE — PROGRESS NOTES
Vitals are stable    Discontinue tele      Laureano Garcia MD.   Hospitalist.  [phone], pager.

## 2023-07-14 NOTE — PLAN OF CARE
"  VS: /61 (BP Location: Right arm)   Pulse 96   Temp 98  F (36.7  C) (Oral)   Resp 20   Ht 1.6 m (5' 3\")   Wt 57 kg (125 lb 10.6 oz)   SpO2 97%   BMI 22.26 kg/m         O2: 97% on RA Denies SOB and chest pain.   Output: Voiding Spontaneously without difficulty.   Last BM: 7/13 per pt report.   Activity: SBA.    Skin: Intact. Icy hot patch on L shoulder.   Pain: Pt report soreness in L neck and shoulder. Provider [facility] notified and ordered Icy hot patch.   CMS: A&Ox4. CMS intact.   Dressing: None.   Diet: Regular, tolerating well. Denies N/V.   LDA: RPIV SL.   Equipment: Iv pole, Personal belongings.   Plan: Continue with POC.   Additional Info: CIWA scored. No intervention necessary. Pt report feeling very anxious. Provider [facility] paged and ordered PRN medication. See MAR.    CT completed this shift.         "

## 2023-07-15 VITALS
TEMPERATURE: 97.4 F | WEIGHT: 125.66 LBS | RESPIRATION RATE: 20 BRPM | HEIGHT: 63 IN | BODY MASS INDEX: 22.27 KG/M2 | SYSTOLIC BLOOD PRESSURE: 139 MMHG | DIASTOLIC BLOOD PRESSURE: 86 MMHG | HEART RATE: 101 BPM | OXYGEN SATURATION: 94 %

## 2023-07-15 LAB
ALBUMIN SERPL BCG-MCNC: 2.9 G/DL (ref 3.5–5.2)
ALP SERPL-CCNC: 78 U/L (ref 35–104)
ALT SERPL W P-5'-P-CCNC: 36 U/L (ref 0–50)
ANION GAP SERPL CALCULATED.3IONS-SCNC: 7 MMOL/L (ref 7–15)
AST SERPL W P-5'-P-CCNC: 33 U/L (ref 0–45)
BILIRUB SERPL-MCNC: 0.4 MG/DL
BUN SERPL-MCNC: 10.7 MG/DL (ref 6–20)
CALCIUM SERPL-MCNC: 7.8 MG/DL (ref 8.6–10)
CHLORIDE SERPL-SCNC: 106 MMOL/L (ref 98–107)
CREAT SERPL-MCNC: 0.69 MG/DL (ref 0.51–0.95)
DEPRECATED HCO3 PLAS-SCNC: 25 MMOL/L (ref 22–29)
ERYTHROCYTE [DISTWIDTH] IN BLOOD BY AUTOMATED COUNT: 13.9 % (ref 10–15)
GFR SERPL CREATININE-BSD FRML MDRD: >90 ML/MIN/1.73M2
GLUCOSE SERPL-MCNC: 100 MG/DL (ref 70–99)
HCT VFR BLD AUTO: 30.5 % (ref 35–47)
HGB BLD-MCNC: 9.8 G/DL (ref 11.7–15.7)
MAGNESIUM SERPL-MCNC: 2.1 MG/DL (ref 1.7–2.3)
MCH RBC QN AUTO: 30.7 PG (ref 26.5–33)
MCHC RBC AUTO-ENTMCNC: 32.1 G/DL (ref 31.5–36.5)
MCV RBC AUTO: 96 FL (ref 78–100)
PHOSPHATE SERPL-MCNC: 2.2 MG/DL (ref 2.5–4.5)
PLATELET # BLD AUTO: 125 10E3/UL (ref 150–450)
POTASSIUM SERPL-SCNC: 4.2 MMOL/L (ref 3.4–5.3)
PROT SERPL-MCNC: 4.7 G/DL (ref 6.4–8.3)
RBC # BLD AUTO: 3.19 10E6/UL (ref 3.8–5.2)
SODIUM SERPL-SCNC: 138 MMOL/L (ref 136–145)
WBC # BLD AUTO: 5.2 10E3/UL (ref 4–11)

## 2023-07-15 PROCEDURE — 85027 COMPLETE CBC AUTOMATED: CPT

## 2023-07-15 PROCEDURE — 36415 COLL VENOUS BLD VENIPUNCTURE: CPT

## 2023-07-15 PROCEDURE — 80053 COMPREHEN METABOLIC PANEL: CPT

## 2023-07-15 PROCEDURE — 99239 HOSP IP/OBS DSCHRG MGMT >30: CPT | Performed by: INTERNAL MEDICINE

## 2023-07-15 PROCEDURE — 84100 ASSAY OF PHOSPHORUS: CPT | Performed by: INTERNAL MEDICINE

## 2023-07-15 PROCEDURE — 83735 ASSAY OF MAGNESIUM: CPT | Performed by: INTERNAL MEDICINE

## 2023-07-15 PROCEDURE — 250N000013 HC RX MED GY IP 250 OP 250 PS 637: Performed by: INTERNAL MEDICINE

## 2023-07-15 PROCEDURE — 250N000013 HC RX MED GY IP 250 OP 250 PS 637

## 2023-07-15 PROCEDURE — 250N000013 HC RX MED GY IP 250 OP 250 PS 637: Performed by: NURSE PRACTITIONER

## 2023-07-15 RX ORDER — GABAPENTIN 300 MG/1
600 CAPSULE ORAL EVERY 8 HOURS
Qty: 12 CAPSULE | Refills: 0 | Status: SHIPPED | OUTPATIENT
Start: 2023-07-15 | End: 2023-07-17

## 2023-07-15 RX ORDER — ESCITALOPRAM OXALATE 5 MG/1
5 TABLET ORAL DAILY
Qty: 30 TABLET | Refills: 0 | Status: SHIPPED | OUTPATIENT
Start: 2023-07-16

## 2023-07-15 RX ORDER — GABAPENTIN 100 MG/1
100 CAPSULE ORAL EVERY 8 HOURS
Qty: 9 CAPSULE | Refills: 0 | Status: SHIPPED | OUTPATIENT
Start: 2023-07-19 | End: 2023-07-22

## 2023-07-15 RX ORDER — LAMOTRIGINE 100 MG/1
100 TABLET ORAL AT BEDTIME
Qty: 30 TABLET | Refills: 0 | Status: SHIPPED | OUTPATIENT
Start: 2023-07-15

## 2023-07-15 RX ORDER — GABAPENTIN 300 MG/1
300 CAPSULE ORAL EVERY 8 HOURS
Qty: 6 CAPSULE | Refills: 0 | Status: SHIPPED | OUTPATIENT
Start: 2023-07-17 | End: 2023-07-19

## 2023-07-15 RX ORDER — LANOLIN ALCOHOL/MO/W.PET/CERES
100 CREAM (GRAM) TOPICAL 3 TIMES DAILY
Qty: 9 TABLET | Refills: 0 | Status: SHIPPED | OUTPATIENT
Start: 2023-07-15 | End: 2023-07-18

## 2023-07-15 RX ORDER — HYDROXYZINE HYDROCHLORIDE 50 MG/1
50 TABLET, FILM COATED ORAL 3 TIMES DAILY PRN
Qty: 30 TABLET | Refills: 0 | Status: SHIPPED | OUTPATIENT
Start: 2023-07-15

## 2023-07-15 RX ORDER — LANOLIN ALCOHOL/MO/W.PET/CERES
100 CREAM (GRAM) TOPICAL DAILY
Qty: 30 TABLET | Refills: 0 | Status: SHIPPED | OUTPATIENT
Start: 2023-07-19

## 2023-07-15 RX ORDER — FOLIC ACID 1 MG/1
1 TABLET ORAL DAILY
Qty: 30 TABLET | Refills: 0 | Status: SHIPPED | OUTPATIENT
Start: 2023-07-16

## 2023-07-15 RX ADMIN — POTASSIUM & SODIUM PHOSPHATES POWDER PACK 280-160-250 MG 1 PACKET: 280-160-250 PACK at 08:35

## 2023-07-15 RX ADMIN — THIAMINE HCL TAB 100 MG 100 MG: 100 TAB at 13:47

## 2023-07-15 RX ADMIN — CLONIDINE HYDROCHLORIDE 0.1 MG: 0.1 TABLET ORAL at 08:36

## 2023-07-15 RX ADMIN — MULTIPLE VITAMINS W/ MINERALS TAB 1 TABLET: TAB at 08:32

## 2023-07-15 RX ADMIN — FOLIC ACID 1 MG: 1 TABLET ORAL at 08:34

## 2023-07-15 RX ADMIN — GABAPENTIN 600 MG: 300 CAPSULE ORAL at 12:45

## 2023-07-15 RX ADMIN — POTASSIUM & SODIUM PHOSPHATES POWDER PACK 280-160-250 MG 1 PACKET: 280-160-250 PACK at 12:43

## 2023-07-15 RX ADMIN — GABAPENTIN 900 MG: 300 CAPSULE ORAL at 04:39

## 2023-07-15 RX ADMIN — BISACODYL 10 MG: 10 SUPPOSITORY RECTAL at 04:40

## 2023-07-15 RX ADMIN — MENTHOL 1 PATCH: 205.5 PATCH TOPICAL at 11:07

## 2023-07-15 RX ADMIN — THIAMINE HCL TAB 100 MG 100 MG: 100 TAB at 08:33

## 2023-07-15 RX ADMIN — MEDROXYPROGESTERONE ACETATE 5 MG: 5 TABLET ORAL at 08:35

## 2023-07-15 RX ADMIN — POTASSIUM & SODIUM PHOSPHATES POWDER PACK 280-160-250 MG 1 PACKET: 280-160-250 PACK at 04:40

## 2023-07-15 RX ADMIN — ESCITALOPRAM OXALATE 5 MG: 5 TABLET, FILM COATED ORAL at 08:33

## 2023-07-15 RX ADMIN — HYDROXYZINE HYDROCHLORIDE 50 MG: 25 TABLET, FILM COATED ORAL at 11:43

## 2023-07-15 RX ADMIN — ESTRADIOL 1 MG: 1 TABLET ORAL at 08:34

## 2023-07-15 ASSESSMENT — ACTIVITIES OF DAILY LIVING (ADL)
ADLS_ACUITY_SCORE: 22

## 2023-07-15 NOTE — PLAN OF CARE
"  VS: /86 (BP Location: Left arm, Patient Position: Semi-Negron's, Cuff Size: Adult Regular)   Pulse 101   Temp 97.4  F (36.3  C) (Oral)   Resp 20   Ht 1.6 m (5' 3\")   Wt 57 kg (125 lb 10.6 oz)   SpO2 94%   BMI 22.26 kg/m         O2: 94% on RA. Denies SOB and chest pain.   Output: Voiding spontaneously without difficulty.   Last BM: 7/15   Activity: SBA    Skin: Intact with RUE bruising.   Pain: C/o soreness in L shoulder. PRN Medication given.   CMS: A&Ox4. Anxious. Able to make needs known. CMS intact.   Dressing: None.   Diet: Regular, tolerating well. Denies N/V.   LDA: RPIV SL.   Equipment: Iv pole, Personal Belongings.   Plan: Discharge today. See Below.    Additional Info: RN managed phosphorous replaced x2 this shift.     Pt anxious and tearful. PRN medication given.        Pt discharged from unit to home with family around 1415. Discharge paperwork discussed with patient. Patient acknowledged understanding and all questions answered. Pt left with all personal belongings and medications.     "

## 2023-07-15 NOTE — DISCHARGE SUMMARY
[facility]  Hospitalist Discharge Summary      Date of Admission:  7/11/2023  Date of Discharge:  7/15/2023  Discharging Provider: Jose Stuart MD  Discharge Service: Hospitalist Service, GOLD TEAM 16    Discharge Diagnoses     Alcohol abuse disorder   Alcohol withdrawal   Depression   Bipolar disorder     Clinically Significant Risk Factors          Follow-ups Needed After Discharge   Follow-up Appointments     Adult [facility] Follow-up and recommended labs and tests      Follow up with primary care provider, Physician No Ref-Primary, within 7   days for hospital follow- up.  The following labs/tests are recommended:   BMP, Phosphorus, Magnesium and CBC.      Appointments on [place] and/or [facility] (with [facility] or [facility]   provider or service). Call [phone] if you haven't heard regarding   these appointments within 7 days of discharge.            Unresulted Labs Ordered in the Past 30 Days of this Admission     No orders found from 6/11/2023 to 7/12/2023.          Discharge Disposition   Discharged to home  Condition at discharge: Stable    Hospital Course   Kallie Estrada is a 58 year old female with PMH notable for anxiety, depression, ETOH dependence. Presented to [facility] ED 7/11/23 with acute ETOH intoxication seeking detox. Admitted to ICU given metabolic acidosis and VS derangements on arrival concerning for development of overt DT's. Improved quickly, transferred to medical floor 7/13. Patient was placed on CIWA protocol.  Metabolic acidosis resolved without complications with medical management. Alcohol withdrawal also resolved. Patient remained hemodynamically stable.  I saw the patient on the day of discharge, she was pleasant, alert and oriented. All her alcohol withdrawal symptoms were resolved. Patient stated that she had a plan in place with her family about her alcohol addiction, she was not interested on inpatient rehab program at  this time.   Patient was hemodynamically stable and afebrile on the day of discharge.     Patient was evaluated by Psychiatry during this hospitalization and gave recommendations as below.     Recommendations:  1. Restarted Lamictal 100mg at bedtime  2. Discontinued Wellbutrin 150mg daily  3. Start Lexapro 5mg daily tomorrow     She should follow-up with her outpatient providers shortly after discharge and I placed referral for psychological testing at the  per pt request.       Consultations This Hospital Stay   DIAGNOSTIC EVALUATION CENTER (DEC) ASSESSMENT ORDER  CHEMICAL DEPENDENCY IP CONSULT  PSYCHIATRY IP CONSULT  PSYCHIATRY IP CONSULT    Code Status   Full Code    Time Spent on this Encounter   I, Jose Stuart MD, personally saw the patient today and spent greater than 30 minutes discharging this patient.       Jose Stuart MD  [facility] [address]  [facility] [address]  Phone: [phone]  Fax: [phone]  ______________________________________________________________________    Physical Exam   Vital Signs: Temp: 97.4  F (36.3  C) Temp src: Oral BP: 139/86 Pulse: 101   Resp: 20 SpO2: 94 % O2 Device: None (Room air)    Weight: 125 lbs 10.6 oz  General Appearance:  Awake, interactive, NAD, anxious  Respiratory: Normal work of breathing.  Cardiovascular: RRR  GI: Soft. NT. ND.  Extremities: Distally wwp. No pedal edema  Neuro: Grossly non focal. No asterixis. Moving all extremities.   Others: Stable mood.        Primary Care Physician   Physician No Ref-Primary    Discharge Orders      Adult Mental Health  Referral      Reason for your hospital stay    58 year old female with PMH notable for anxiety, depression, ETOH dependence. Presented to [facility] ED 7/11/23 with acute ETOH intoxication seeking detox. Admitted to ICU given metabolic acidosis and VS derangements on arrival concerning for development of overt DTs. Improved quickly, transferred to medical floor 7/13.      Activity    Your activity upon discharge: activity as tolerated     Adult [facility] Follow-up and recommended labs and tests    Follow up with primary care provider, Physician No Ref-Primary, within 7 days for hospital follow- up.  The following labs/tests are recommended: BMP, Phosphorus, Magnesium and CBC.      Appointments on [place] and/or [facility] (with [facility] or [facility] provider or service). Call [phone] if you haven't heard regarding these appointments within 7 days of discharge.     Diet    Follow this diet upon discharge: Orders Placed This Encounter      Regular Diet Adult       Significant Results and Procedures   Results for orders placed or performed during the hospital encounter of 07/11/23   CT Head w/o Contrast    Narrative    EXAM: CT HEAD W/O CONTRAST  7/14/2023 3:56 PM     HISTORY: h.o fall. L hemicranial headache.       COMPARISON: 4/10/2010    TECHNIQUE: Using multidetector thin collimation helical acquisition  technique, axial, coronal and sagittal CT images from the skull base  to the vertex were obtained without intravenous contrast.   (topogram) image(s) also obtained and reviewed.    FINDINGS:  No acute intracranial hemorrhage, mass effect, or midline shift. No  acute loss of gray-white matter differentiation in the cerebral  hemispheres. Ventricles are proportionate to the cerebral sulci. Cavum  septa pellucida. Clear basal cisterns.    The bony calvaria and the bones of the skull base are normal. The  visualized portions of the paranasal sinuses and mastoid air cells are  clear. Grossly normal orbits.       Impression    IMPRESSION: No acute intracranial pathology.     I have personally reviewed the examination and initial interpretation  and I agree with the findings.    JUN NOGUERA MD         SYSTEM ID:  [id number]       Discharge Medications   Current Discharge Medication List      START taking these medications    Details   escitalopram (LEXAPRO) 5 MG tablet Take 1  tablet (5 mg) by mouth daily  Qty: 30 tablet, Refills: 0    Associated Diagnoses: Alcohol use disorder, severe, dependence (H)      folic acid (FOLVITE) 1 MG tablet Take 1 tablet (1 mg) by mouth daily  Qty: 30 tablet, Refills: 0    Associated Diagnoses: Alcohol use disorder, severe, dependence (H)      !! gabapentin (NEURONTIN) 100 MG capsule Take 1 capsule (100 mg) by mouth every 8 hours for 9 doses  Qty: 9 capsule, Refills: 0    Associated Diagnoses: Alcohol use disorder, severe, dependence (H)      !! gabapentin (NEURONTIN) 300 MG capsule Take 2 capsules (600 mg) by mouth every 8 hours for 6 doses  Qty: 12 capsule, Refills: 0    Associated Diagnoses: Alcohol use disorder, severe, dependence (H)      !! gabapentin (NEURONTIN) 300 MG capsule Take 1 capsule (300 mg) by mouth every 8 hours for 6 doses  Qty: 6 capsule, Refills: 0    Associated Diagnoses: Alcohol use disorder, severe, dependence (H)      hydrOXYzine (ATARAX) 50 MG tablet Take 1 tablet (50 mg) by mouth 3 times daily as needed for anxiety  Qty: 30 tablet, Refills: 0    Associated Diagnoses: Alcohol use disorder, severe, dependence (H)      !! thiamine (B-1) 100 MG tablet Take 1 tablet (100 mg) by mouth 3 times daily for 3 days  Qty: 9 tablet, Refills: 0    Associated Diagnoses: Alcohol use disorder, severe, dependence (H)      !! thiamine (B-1) 100 MG tablet Take 1 tablet (100 mg) by mouth daily  Qty: 30 tablet, Refills: 0    Associated Diagnoses: Alcohol use disorder, severe, dependence (H)       !! - Potential duplicate medications found. Please discuss with provider.      CONTINUE these medications which have CHANGED    Details   lamoTRIgine (LAMICTAL) 100 MG tablet Take 1 tablet (100 mg) by mouth At Bedtime  Qty: 30 tablet, Refills: 0    Associated Diagnoses: Alcohol use disorder, severe, dependence (H)         CONTINUE these medications which have NOT CHANGED    Details   estradiol (ESTRACE) 1 MG tablet Take 1 mg by mouth daily       MEDROXYPROGESTERONE ACETATE PO Take 5 mg by mouth daily      multivitamin w/minerals (THERA-VIT-M) tablet Take 1 tablet by mouth daily      vitamin B complex with vitamin C (STRESS TAB) tablet Take 1 tablet by mouth daily         STOP taking these medications       buPROPion (WELLBUTRIN XL) 150 MG 24 hr tablet Comments:   Reason for Stopping:         traZODone (DESYREL) 50 MG tablet Comments:   Reason for Stopping:             Allergies   Allergies   Allergen Reactions     Eggs [Chicken-Derived Products (Egg)] Anaphylaxis     Sulfa Antibiotics Anaphylaxis

## 2023-07-15 NOTE — PLAN OF CARE
"/57 (BP Location: Left arm)   Pulse 96   Temp 99  F (37.2  C) (Oral)   Resp 20   Ht 1.6 m (5' 3\")   Wt 57 kg (125 lb 10.6 oz)   SpO2 97%   BMI 22.26 kg/m         A&Ox4. CMS intact. Denies numbness/tingling, nausea,vomiting, SOB, and chest pain.  Up w/SBA ambulated up and down hallway x2.   Voiding adequate amounts. LBM: 7/11 pt. reports having constipation. PRN bowel regimen started. Pt. Reported large BM after enema administered.   RN managed phos. Replaced orally.  Pt. Denies pain, reported no problems with sleeping, and stated that she has mild anxiety managed with scheduled medication see MAR. No PRN needed for anxiety. Pt. Was pleasant, calm, and cooperative.  Expected discharge: 7/18/23  Continue with POC.                       "

## 2023-10-10 ENCOUNTER — LAB REQUISITION (OUTPATIENT)
Dept: LAB | Facility: CLINIC | Age: 58
End: 2023-10-10

## 2023-10-10 DIAGNOSIS — R87.810 CERVICAL HIGH RISK HUMAN PAPILLOMAVIRUS (HPV) DNA TEST POSITIVE: ICD-10-CM

## 2023-10-10 PROCEDURE — 88305 TISSUE EXAM BY PATHOLOGIST: CPT | Performed by: PATHOLOGY

## 2023-10-13 LAB
PATH REPORT.COMMENTS IMP SPEC: NORMAL
PATH REPORT.COMMENTS IMP SPEC: NORMAL
PATH REPORT.FINAL DX SPEC: NORMAL
PATH REPORT.GROSS SPEC: NORMAL
PATH REPORT.MICROSCOPIC SPEC OTHER STN: NORMAL
PATH REPORT.RELEVANT HX SPEC: NORMAL
PHOTO IMAGE: NORMAL

## 2024-01-05 ENCOUNTER — TELEPHONE (OUTPATIENT)
Dept: BEHAVIORAL HEALTH | Facility: CLINIC | Age: 59
End: 2024-01-05
Payer: COMMERCIAL

## 2024-01-05 NOTE — TELEPHONE ENCOUNTER
Writer also recommended pt utilize [facility].    [facility] Counseling: I discussed with the patient the risks of risankizumab-rzaa including but not limited to immunosuppression, and serious infections.  The patient understands that monitoring is required including a PPD at baseline and must alert us or the primary physician if symptoms of infection or other concerning signs are noted.

## 2024-01-05 NOTE — TELEPHONE ENCOUNTER
"Pt called OB line and writer answered. Pt said she was looking for help with her JAREK and mental health.     Pt was recently working with [facility] for treatment but now wants to come to [facility], Pt said \"I am not safe\"  Writer asked pt about suicidal plan or intent which she denied, pt denied having a gun in her residence. Pt said her children were on their way to bring her to the hospital. Writer recommended [facility] ED to have pt be assessed for mental health and medical stability.   Pt was in the ED on 1/3  for ETOH intoxication at [facility]   She was discharged and returned on 1/4 an hour after she discharged   She then left [facility] and went to [facility]   Pt was speaking very fast and also said \"I just feel lost.\" Per pt she is out of detox and has been sober for a couple days, she is more concerned about her mental health than her drinking at this time.     Writer called pt back later after initial conversation to check in, pt told writer that she was safe and just waiting for her kids. She said she would call writer when her children arrived.     "

## 2024-08-03 ENCOUNTER — HEALTH MAINTENANCE LETTER (OUTPATIENT)
Age: 59
End: 2024-08-03

## 2025-02-07 ENCOUNTER — APPOINTMENT (OUTPATIENT)
Dept: CT IMAGING | Facility: HOSPITAL | Age: 60
End: 2025-02-07
Attending: EMERGENCY MEDICINE
Payer: COMMERCIAL

## 2025-02-07 ENCOUNTER — HOSPITAL ENCOUNTER (INPATIENT)
Facility: HOSPITAL | Age: 60
LOS: 2 days | Discharge: HOME | End: 2025-02-10
Attending: EMERGENCY MEDICINE | Admitting: HOSPITALIST
Payer: COMMERCIAL

## 2025-02-07 DIAGNOSIS — R94.31 PROLONGED Q-T INTERVAL ON ECG: ICD-10-CM

## 2025-02-07 DIAGNOSIS — F10.10 ALCOHOL ABUSE: ICD-10-CM

## 2025-02-07 DIAGNOSIS — E83.39 HYPOPHOSPHATEMIA: ICD-10-CM

## 2025-02-07 DIAGNOSIS — F10.930 ALCOHOL WITHDRAWAL, UNCOMPLICATED (H): Primary | ICD-10-CM

## 2025-02-07 LAB
ALBUMIN SERPL BCG-MCNC: 4.2 G/DL (ref 3.5–5.2)
ALP SERPL-CCNC: 124 U/L (ref 40–150)
ALT SERPL W P-5'-P-CCNC: 23 U/L (ref 0–50)
ANION GAP SERPL CALCULATED.3IONS-SCNC: 24 MMOL/L (ref 7–15)
AST SERPL W P-5'-P-CCNC: 39 U/L (ref 0–45)
BASOPHILS # BLD AUTO: 0.1 10E3/UL (ref 0–0.2)
BASOPHILS NFR BLD AUTO: 1 %
BILIRUB DIRECT SERPL-MCNC: <0.2 MG/DL (ref 0–0.3)
BILIRUB SERPL-MCNC: 0.3 MG/DL
BUN SERPL-MCNC: 12.5 MG/DL (ref 8–23)
CALCIUM SERPL-MCNC: 9 MG/DL (ref 8.8–10.4)
CHLORIDE SERPL-SCNC: 106 MMOL/L (ref 98–107)
CK SERPL-CCNC: 187 U/L (ref 26–192)
CREAT SERPL-MCNC: 0.93 MG/DL (ref 0.51–0.95)
EGFRCR SERPLBLD CKD-EPI 2021: 70 ML/MIN/1.73M2
EOSINOPHIL # BLD AUTO: 0.2 10E3/UL (ref 0–0.7)
EOSINOPHIL NFR BLD AUTO: 2 %
ERYTHROCYTE [DISTWIDTH] IN BLOOD BY AUTOMATED COUNT: 13.4 % (ref 10–15)
ETHANOL SERPL-MCNC: 0.07 G/DL
GLUCOSE SERPL-MCNC: 125 MG/DL (ref 70–99)
HCO3 SERPL-SCNC: 16 MMOL/L (ref 22–29)
HCT VFR BLD AUTO: 38.3 % (ref 35–47)
HGB BLD-MCNC: 13 G/DL (ref 11.7–15.7)
IMM GRANULOCYTES # BLD: 0.1 10E3/UL
IMM GRANULOCYTES NFR BLD: 1 %
LIPASE SERPL-CCNC: 74 U/L (ref 13–60)
LYMPHOCYTES # BLD AUTO: 2.5 10E3/UL (ref 0.8–5.3)
LYMPHOCYTES NFR BLD AUTO: 30 %
MAGNESIUM SERPL-MCNC: 1.8 MG/DL (ref 1.7–2.3)
MCH RBC QN AUTO: 31 PG (ref 26.5–33)
MCHC RBC AUTO-ENTMCNC: 33.9 G/DL (ref 31.5–36.5)
MCV RBC AUTO: 91 FL (ref 78–100)
MONOCYTES # BLD AUTO: 1 10E3/UL (ref 0–1.3)
MONOCYTES NFR BLD AUTO: 12 %
NEUTROPHILS # BLD AUTO: 4.6 10E3/UL (ref 1.6–8.3)
NEUTROPHILS NFR BLD AUTO: 55 %
NRBC # BLD AUTO: 0 10E3/UL
NRBC BLD AUTO-RTO: 0 /100
PHOSPHATE SERPL-MCNC: 1.7 MG/DL (ref 2.5–4.5)
PLATELET # BLD AUTO: 351 10E3/UL (ref 150–450)
POTASSIUM SERPL-SCNC: 3.5 MMOL/L (ref 3.4–5.3)
PROT SERPL-MCNC: 6.7 G/DL (ref 6.4–8.3)
RADIOLOGIST FLAGS: NORMAL
RBC # BLD AUTO: 4.2 10E6/UL (ref 3.8–5.2)
SODIUM SERPL-SCNC: 146 MMOL/L (ref 135–145)
TROPONIN T SERPL HS-MCNC: 13 NG/L
TROPONIN T SERPL HS-MCNC: 15 NG/L
WBC # BLD AUTO: 8.3 10E3/UL (ref 4–11)

## 2025-02-07 PROCEDURE — 83690 ASSAY OF LIPASE: CPT | Performed by: EMERGENCY MEDICINE

## 2025-02-07 PROCEDURE — 96365 THER/PROPH/DIAG IV INF INIT: CPT

## 2025-02-07 PROCEDURE — 258N000003 HC RX IP 258 OP 636: Performed by: EMERGENCY MEDICINE

## 2025-02-07 PROCEDURE — 36415 COLL VENOUS BLD VENIPUNCTURE: CPT | Performed by: EMERGENCY MEDICINE

## 2025-02-07 PROCEDURE — 96376 TX/PRO/DX INJ SAME DRUG ADON: CPT

## 2025-02-07 PROCEDURE — 96375 TX/PRO/DX INJ NEW DRUG ADDON: CPT

## 2025-02-07 PROCEDURE — 96361 HYDRATE IV INFUSION ADD-ON: CPT

## 2025-02-07 PROCEDURE — 250N000011 HC RX IP 250 OP 636: Performed by: EMERGENCY MEDICINE

## 2025-02-07 PROCEDURE — 250N000009 HC RX 250: Performed by: EMERGENCY MEDICINE

## 2025-02-07 PROCEDURE — 83735 ASSAY OF MAGNESIUM: CPT | Performed by: EMERGENCY MEDICINE

## 2025-02-07 PROCEDURE — 93005 ELECTROCARDIOGRAM TRACING: CPT | Performed by: EMERGENCY MEDICINE

## 2025-02-07 PROCEDURE — 85004 AUTOMATED DIFF WBC COUNT: CPT | Performed by: EMERGENCY MEDICINE

## 2025-02-07 PROCEDURE — 82550 ASSAY OF CK (CPK): CPT | Performed by: EMERGENCY MEDICINE

## 2025-02-07 PROCEDURE — 84100 ASSAY OF PHOSPHORUS: CPT | Performed by: EMERGENCY MEDICINE

## 2025-02-07 PROCEDURE — 84155 ASSAY OF PROTEIN SERUM: CPT | Performed by: EMERGENCY MEDICINE

## 2025-02-07 PROCEDURE — 74177 CT ABD & PELVIS W/CONTRAST: CPT

## 2025-02-07 PROCEDURE — 82077 ASSAY SPEC XCP UR&BREATH IA: CPT | Performed by: EMERGENCY MEDICINE

## 2025-02-07 PROCEDURE — HZ2ZZZZ DETOXIFICATION SERVICES FOR SUBSTANCE ABUSE TREATMENT: ICD-10-PCS | Performed by: EMERGENCY MEDICINE

## 2025-02-07 PROCEDURE — 85018 HEMOGLOBIN: CPT | Performed by: EMERGENCY MEDICINE

## 2025-02-07 PROCEDURE — 99291 CRITICAL CARE FIRST HOUR: CPT | Mod: 25

## 2025-02-07 PROCEDURE — 250N000013 HC RX MED GY IP 250 OP 250 PS 637: Performed by: EMERGENCY MEDICINE

## 2025-02-07 PROCEDURE — 84484 ASSAY OF TROPONIN QUANT: CPT | Performed by: EMERGENCY MEDICINE

## 2025-02-07 PROCEDURE — 85041 AUTOMATED RBC COUNT: CPT | Performed by: EMERGENCY MEDICINE

## 2025-02-07 RX ORDER — DEXTROAMPHETAMINE SACCHARATE, AMPHETAMINE ASPARTATE MONOHYDRATE, DEXTROAMPHETAMINE SULFATE AND AMPHETAMINE SULFATE 2.5; 2.5; 2.5; 2.5 MG/1; MG/1; MG/1; MG/1
10 CAPSULE, EXTENDED RELEASE ORAL DAILY
COMMUNITY

## 2025-02-07 RX ORDER — DIAZEPAM 5 MG/1
5 TABLET ORAL ONCE
Status: COMPLETED | OUTPATIENT
Start: 2025-02-07 | End: 2025-02-07

## 2025-02-07 RX ORDER — CLONIDINE HYDROCHLORIDE 0.1 MG/1
0.1 TABLET ORAL 2 TIMES DAILY
COMMUNITY

## 2025-02-07 RX ORDER — IOPAMIDOL 755 MG/ML
69 INJECTION, SOLUTION INTRAVASCULAR ONCE
Status: COMPLETED | OUTPATIENT
Start: 2025-02-07 | End: 2025-02-07

## 2025-02-07 RX ORDER — FOLIC ACID 1 MG/1
1 TABLET ORAL ONCE
Status: COMPLETED | OUTPATIENT
Start: 2025-02-07 | End: 2025-02-08

## 2025-02-07 RX ORDER — DEXTROAMPHETAMINE SACCHARATE, AMPHETAMINE ASPARTATE, DEXTROAMPHETAMINE SULFATE AND AMPHETAMINE SULFATE 1.25; 1.25; 1.25; 1.25 MG/1; MG/1; MG/1; MG/1
5 TABLET ORAL
COMMUNITY

## 2025-02-07 RX ORDER — FLUMAZENIL 0.1 MG/ML
0.2 INJECTION, SOLUTION INTRAVENOUS
Status: DISCONTINUED | OUTPATIENT
Start: 2025-02-07 | End: 2025-02-08

## 2025-02-07 RX ORDER — LORAZEPAM 2 MG/ML
1 INJECTION INTRAMUSCULAR ONCE
Status: COMPLETED | OUTPATIENT
Start: 2025-02-07 | End: 2025-02-07

## 2025-02-07 RX ORDER — DIAZEPAM 10 MG/1
10 TABLET ORAL EVERY 30 MIN PRN
Status: DISCONTINUED | OUTPATIENT
Start: 2025-02-07 | End: 2025-02-08

## 2025-02-07 RX ORDER — HALOPERIDOL 5 MG/ML
2.5-5 INJECTION INTRAMUSCULAR EVERY 6 HOURS PRN
Status: DISCONTINUED | OUTPATIENT
Start: 2025-02-07 | End: 2025-02-08

## 2025-02-07 RX ORDER — MULTIPLE VITAMINS W/ MINERALS TAB 9MG-400MCG
1 TAB ORAL ONCE
Status: COMPLETED | OUTPATIENT
Start: 2025-02-07 | End: 2025-02-08

## 2025-02-07 RX ORDER — LORAZEPAM 2 MG/ML
0.5 INJECTION INTRAMUSCULAR ONCE
Status: COMPLETED | OUTPATIENT
Start: 2025-02-07 | End: 2025-02-07

## 2025-02-07 RX ORDER — DIAZEPAM 10 MG/2ML
5-10 INJECTION, SOLUTION INTRAMUSCULAR; INTRAVENOUS EVERY 30 MIN PRN
Status: DISCONTINUED | OUTPATIENT
Start: 2025-02-07 | End: 2025-02-08

## 2025-02-07 RX ORDER — METOPROLOL SUCCINATE 50 MG/1
50 TABLET, EXTENDED RELEASE ORAL DAILY
COMMUNITY

## 2025-02-07 RX ORDER — OLANZAPINE 5 MG/1
5-10 TABLET, ORALLY DISINTEGRATING ORAL EVERY 6 HOURS PRN
Status: DISCONTINUED | OUTPATIENT
Start: 2025-02-07 | End: 2025-02-08

## 2025-02-07 RX ORDER — TRAZODONE HYDROCHLORIDE 50 MG/1
50 TABLET ORAL
COMMUNITY

## 2025-02-07 RX ORDER — CLONIDINE HYDROCHLORIDE 0.1 MG/1
0.1 TABLET ORAL EVERY 8 HOURS
Status: DISCONTINUED | OUTPATIENT
Start: 2025-02-07 | End: 2025-02-08

## 2025-02-07 RX ORDER — PROMETHAZINE HYDROCHLORIDE 12.5 MG/1
12.5 TABLET ORAL DAILY
COMMUNITY

## 2025-02-07 RX ORDER — POTASSIUM CHLORIDE 7.45 MG/ML
10 INJECTION INTRAVENOUS ONCE
Status: COMPLETED | OUTPATIENT
Start: 2025-02-07 | End: 2025-02-07

## 2025-02-07 RX ORDER — DIAZEPAM 5 MG/1
5 TABLET ORAL ONCE
Status: DISCONTINUED | OUTPATIENT
Start: 2025-02-07 | End: 2025-02-07

## 2025-02-07 RX ORDER — BUPROPION HYDROCHLORIDE 150 MG/1
150 TABLET, EXTENDED RELEASE ORAL DAILY
COMMUNITY

## 2025-02-07 RX ADMIN — SODIUM CHLORIDE, POTASSIUM CHLORIDE, SODIUM LACTATE AND CALCIUM CHLORIDE 1000 ML: 600; 310; 30; 20 INJECTION, SOLUTION INTRAVENOUS at 21:46

## 2025-02-07 RX ADMIN — POTASSIUM CHLORIDE 10 MEQ: 7.46 INJECTION, SOLUTION INTRAVENOUS at 20:55

## 2025-02-07 RX ADMIN — LORAZEPAM 0.5 MG: 2 INJECTION INTRAMUSCULAR; INTRAVENOUS at 21:47

## 2025-02-07 RX ADMIN — IOPAMIDOL 69 ML: 755 INJECTION, SOLUTION INTRAVENOUS at 22:29

## 2025-02-07 RX ADMIN — DIAZEPAM 5 MG: 5 TABLET ORAL at 21:47

## 2025-02-07 RX ADMIN — LORAZEPAM 1 MG: 2 INJECTION INTRAMUSCULAR; INTRAVENOUS at 19:58

## 2025-02-07 RX ADMIN — SODIUM PHOSPHATE, MONOBASIC, MONOHYDRATE AND SODIUM PHOSPHATE, DIBASIC, ANHYDROUS 15 MMOL: 142; 276 INJECTION, SOLUTION INTRAVENOUS at 21:11

## 2025-02-07 RX ADMIN — LORAZEPAM 0.5 MG: 2 INJECTION INTRAMUSCULAR; INTRAVENOUS at 20:24

## 2025-02-07 RX ADMIN — SODIUM CHLORIDE, POTASSIUM CHLORIDE, SODIUM LACTATE AND CALCIUM CHLORIDE 1000 ML: 600; 310; 30; 20 INJECTION, SOLUTION INTRAVENOUS at 20:26

## 2025-02-07 ASSESSMENT — COLUMBIA-SUICIDE SEVERITY RATING SCALE - C-SSRS
2. HAVE YOU ACTUALLY HAD ANY THOUGHTS OF KILLING YOURSELF IN THE PAST MONTH?: NO
6. HAVE YOU EVER DONE ANYTHING, STARTED TO DO ANYTHING, OR PREPARED TO DO ANYTHING TO END YOUR LIFE?: NO
1. IN THE PAST MONTH, HAVE YOU WISHED YOU WERE DEAD OR WISHED YOU COULD GO TO SLEEP AND NOT WAKE UP?: NO

## 2025-02-07 ASSESSMENT — LIFESTYLE VARIABLES
NAUSEA AND VOMITING: NO NAUSEA AND NO VOMITING
HEADACHE, FULLNESS IN HEAD: MILD
TREMOR: 2
ANXIETY: 2
ORIENTATION AND CLOUDING OF SENSORIUM: ORIENTED AND CAN DO SERIAL ADDITIONS
AUDITORY DISTURBANCES: NOT PRESENT
PAROXYSMAL SWEATS: 2
VISUAL DISTURBANCES: NOT PRESENT
AGITATION: NORMAL ACTIVITY
TOTAL SCORE: 8

## 2025-02-07 ASSESSMENT — ACTIVITIES OF DAILY LIVING (ADL)
ADLS_ACUITY_SCORE: 47

## 2025-02-07 ASSESSMENT — ENCOUNTER SYMPTOMS
VOMITING: 0
COUGH: 0
DIARRHEA: 0
ABDOMINAL PAIN: 0
FEVER: 0
SHORTNESS OF BREATH: 0
NAUSEA: 0
DYSURIA: 0

## 2025-02-08 PROBLEM — F10.930 ALCOHOL WITHDRAWAL, UNCOMPLICATED (H): Status: ACTIVE | Noted: 2025-02-08

## 2025-02-08 PROBLEM — E83.39 HYPOPHOSPHATEMIA: Status: ACTIVE | Noted: 2025-02-08

## 2025-02-08 PROBLEM — F10.10 ALCOHOL ABUSE: Status: ACTIVE | Noted: 2025-02-08

## 2025-02-08 PROBLEM — R94.31 PROLONGED Q-T INTERVAL ON ECG: Status: ACTIVE | Noted: 2025-02-08

## 2025-02-08 LAB
ANION GAP SERPL CALCULATED.3IONS-SCNC: 14 MMOL/L (ref 7–15)
BUN SERPL-MCNC: 11 MG/DL (ref 8–23)
CALCIUM SERPL-MCNC: 8.6 MG/DL (ref 8.8–10.4)
CHLORIDE SERPL-SCNC: 103 MMOL/L (ref 98–107)
CREAT SERPL-MCNC: 0.76 MG/DL (ref 0.51–0.95)
EGFRCR SERPLBLD CKD-EPI 2021: 90 ML/MIN/1.73M2
GLUCOSE SERPL-MCNC: 117 MG/DL (ref 70–99)
HCO3 SERPL-SCNC: 23 MMOL/L (ref 22–29)
MAGNESIUM SERPL-MCNC: 1.6 MG/DL (ref 1.7–2.3)
PHOSPHATE SERPL-MCNC: 4 MG/DL (ref 2.5–4.5)
POTASSIUM SERPL-SCNC: 3.6 MMOL/L (ref 3.4–5.3)
POTASSIUM SERPL-SCNC: 3.6 MMOL/L (ref 3.4–5.3)
SODIUM SERPL-SCNC: 140 MMOL/L (ref 135–145)

## 2025-02-08 PROCEDURE — 250N000013 HC RX MED GY IP 250 OP 250 PS 637: Performed by: STUDENT IN AN ORGANIZED HEALTH CARE EDUCATION/TRAINING PROGRAM

## 2025-02-08 PROCEDURE — 250N000011 HC RX IP 250 OP 636: Performed by: EMERGENCY MEDICINE

## 2025-02-08 PROCEDURE — 96374 THER/PROPH/DIAG INJ IV PUSH: CPT

## 2025-02-08 PROCEDURE — 250N000013 HC RX MED GY IP 250 OP 250 PS 637: Performed by: EMERGENCY MEDICINE

## 2025-02-08 PROCEDURE — 250N000011 HC RX IP 250 OP 636: Performed by: HOSPITALIST

## 2025-02-08 PROCEDURE — 83735 ASSAY OF MAGNESIUM: CPT | Performed by: HOSPITALIST

## 2025-02-08 PROCEDURE — 93005 ELECTROCARDIOGRAM TRACING: CPT

## 2025-02-08 PROCEDURE — 80048 BASIC METABOLIC PNL TOTAL CA: CPT | Performed by: EMERGENCY MEDICINE

## 2025-02-08 PROCEDURE — 120N000004 HC R&B MS OVERFLOW

## 2025-02-08 PROCEDURE — 99222 1ST HOSP IP/OBS MODERATE 55: CPT | Performed by: HOSPITALIST

## 2025-02-08 PROCEDURE — 84100 ASSAY OF PHOSPHORUS: CPT | Performed by: HOSPITALIST

## 2025-02-08 PROCEDURE — 93010 ELECTROCARDIOGRAM REPORT: CPT | Performed by: INTERNAL MEDICINE

## 2025-02-08 PROCEDURE — 36415 COLL VENOUS BLD VENIPUNCTURE: CPT | Performed by: EMERGENCY MEDICINE

## 2025-02-08 PROCEDURE — 250N000013 HC RX MED GY IP 250 OP 250 PS 637: Performed by: HOSPITALIST

## 2025-02-08 RX ORDER — AMOXICILLIN 250 MG
2 CAPSULE ORAL 2 TIMES DAILY PRN
Status: DISCONTINUED | OUTPATIENT
Start: 2025-02-08 | End: 2025-02-10 | Stop reason: HOSPADM

## 2025-02-08 RX ORDER — DIAZEPAM 10 MG/2ML
5-10 INJECTION, SOLUTION INTRAMUSCULAR; INTRAVENOUS EVERY 30 MIN PRN
Status: DISCONTINUED | OUTPATIENT
Start: 2025-02-08 | End: 2025-02-10 | Stop reason: HOSPADM

## 2025-02-08 RX ORDER — MULTIPLE VITAMINS W/ MINERALS TAB 9MG-400MCG
1 TAB ORAL DAILY
Status: DISCONTINUED | OUTPATIENT
Start: 2025-02-09 | End: 2025-02-10 | Stop reason: HOSPADM

## 2025-02-08 RX ORDER — HYDRALAZINE HYDROCHLORIDE 10 MG/1
10 TABLET, FILM COATED ORAL EVERY 4 HOURS PRN
Status: DISCONTINUED | OUTPATIENT
Start: 2025-02-08 | End: 2025-02-10 | Stop reason: HOSPADM

## 2025-02-08 RX ORDER — PROCHLORPERAZINE MALEATE 5 MG/1
10 TABLET ORAL EVERY 6 HOURS PRN
Status: DISCONTINUED | OUTPATIENT
Start: 2025-02-08 | End: 2025-02-10 | Stop reason: HOSPADM

## 2025-02-08 RX ORDER — CALCIUM CARBONATE 500 MG/1
1000 TABLET, CHEWABLE ORAL 4 TIMES DAILY PRN
Status: DISCONTINUED | OUTPATIENT
Start: 2025-02-08 | End: 2025-02-10 | Stop reason: HOSPADM

## 2025-02-08 RX ORDER — CLONIDINE HYDROCHLORIDE 0.1 MG/1
0.1 TABLET ORAL 2 TIMES DAILY
Status: DISCONTINUED | OUTPATIENT
Start: 2025-02-08 | End: 2025-02-10 | Stop reason: HOSPADM

## 2025-02-08 RX ORDER — OLANZAPINE 5 MG/1
5-10 TABLET, ORALLY DISINTEGRATING ORAL EVERY 6 HOURS PRN
Status: DISCONTINUED | OUTPATIENT
Start: 2025-02-08 | End: 2025-02-10 | Stop reason: HOSPADM

## 2025-02-08 RX ORDER — HALOPERIDOL 5 MG/ML
2.5-5 INJECTION INTRAMUSCULAR EVERY 6 HOURS PRN
Status: DISCONTINUED | OUTPATIENT
Start: 2025-02-08 | End: 2025-02-10 | Stop reason: HOSPADM

## 2025-02-08 RX ORDER — FLUMAZENIL 0.1 MG/ML
0.2 INJECTION, SOLUTION INTRAVENOUS
Status: DISCONTINUED | OUTPATIENT
Start: 2025-02-08 | End: 2025-02-10 | Stop reason: HOSPADM

## 2025-02-08 RX ORDER — BUPROPION HYDROCHLORIDE 150 MG/1
150 TABLET, EXTENDED RELEASE ORAL DAILY
Status: DISCONTINUED | OUTPATIENT
Start: 2025-02-08 | End: 2025-02-10 | Stop reason: HOSPADM

## 2025-02-08 RX ORDER — AMOXICILLIN 250 MG
1 CAPSULE ORAL 2 TIMES DAILY PRN
Status: DISCONTINUED | OUTPATIENT
Start: 2025-02-08 | End: 2025-02-10 | Stop reason: HOSPADM

## 2025-02-08 RX ORDER — ACETAMINOPHEN 650 MG/1
650 SUPPOSITORY RECTAL EVERY 4 HOURS PRN
Status: DISCONTINUED | OUTPATIENT
Start: 2025-02-08 | End: 2025-02-10 | Stop reason: HOSPADM

## 2025-02-08 RX ORDER — IBUPROFEN 200 MG
400 TABLET ORAL ONCE
Status: COMPLETED | OUTPATIENT
Start: 2025-02-08 | End: 2025-02-08

## 2025-02-08 RX ORDER — ACETAMINOPHEN 325 MG/1
650 TABLET ORAL EVERY 4 HOURS PRN
Status: DISCONTINUED | OUTPATIENT
Start: 2025-02-08 | End: 2025-02-10 | Stop reason: HOSPADM

## 2025-02-08 RX ORDER — DIAZEPAM 5 MG/1
10 TABLET ORAL EVERY 30 MIN PRN
Status: DISCONTINUED | OUTPATIENT
Start: 2025-02-08 | End: 2025-02-10 | Stop reason: HOSPADM

## 2025-02-08 RX ORDER — MAGNESIUM OXIDE 400 MG/1
400 TABLET ORAL EVERY 4 HOURS
Status: COMPLETED | OUTPATIENT
Start: 2025-02-08 | End: 2025-02-08

## 2025-02-08 RX ORDER — SUCRALFATE ORAL 1 G/10ML
1 SUSPENSION ORAL
Status: DISCONTINUED | OUTPATIENT
Start: 2025-02-08 | End: 2025-02-10 | Stop reason: HOSPADM

## 2025-02-08 RX ORDER — LIDOCAINE 40 MG/G
CREAM TOPICAL
Status: DISCONTINUED | OUTPATIENT
Start: 2025-02-08 | End: 2025-02-10 | Stop reason: HOSPADM

## 2025-02-08 RX ORDER — FOLIC ACID 1 MG/1
1 TABLET ORAL DAILY
Status: DISCONTINUED | OUTPATIENT
Start: 2025-02-09 | End: 2025-02-10 | Stop reason: HOSPADM

## 2025-02-08 RX ORDER — MAGNESIUM HYDROXIDE/ALUMINUM HYDROXICE/SIMETHICONE 120; 1200; 1200 MG/30ML; MG/30ML; MG/30ML
30 SUSPENSION ORAL EVERY 4 HOURS PRN
Status: DISCONTINUED | OUTPATIENT
Start: 2025-02-08 | End: 2025-02-10 | Stop reason: HOSPADM

## 2025-02-08 RX ORDER — METOPROLOL SUCCINATE 50 MG/1
50 TABLET, EXTENDED RELEASE ORAL DAILY
Status: DISCONTINUED | OUTPATIENT
Start: 2025-02-08 | End: 2025-02-10 | Stop reason: HOSPADM

## 2025-02-08 RX ORDER — CLONIDINE HYDROCHLORIDE 0.1 MG/1
0.1 TABLET ORAL 2 TIMES DAILY
Status: DISCONTINUED | OUTPATIENT
Start: 2025-02-08 | End: 2025-02-08

## 2025-02-08 RX ORDER — HYDRALAZINE HYDROCHLORIDE 20 MG/ML
10 INJECTION INTRAMUSCULAR; INTRAVENOUS EVERY 4 HOURS PRN
Status: DISCONTINUED | OUTPATIENT
Start: 2025-02-08 | End: 2025-02-10 | Stop reason: HOSPADM

## 2025-02-08 RX ADMIN — BUPROPION HYDROCHLORIDE 150 MG: 150 TABLET, FILM COATED, EXTENDED RELEASE ORAL at 09:01

## 2025-02-08 RX ADMIN — ACETAMINOPHEN 650 MG: 325 TABLET, FILM COATED ORAL at 02:58

## 2025-02-08 RX ADMIN — IBUPROFEN 400 MG: 200 TABLET, FILM COATED ORAL at 17:08

## 2025-02-08 RX ADMIN — DIAZEPAM 10 MG: 10 TABLET ORAL at 01:02

## 2025-02-08 RX ADMIN — Medication 1 TABLET: at 00:50

## 2025-02-08 RX ADMIN — MAGNESIUM OXIDE TAB 400 MG (241.3 MG ELEMENTAL MG) 400 MG: 400 (241.3 MG) TAB at 09:01

## 2025-02-08 RX ADMIN — SUCRALFATE 1 G: 1 SUSPENSION ORAL at 21:02

## 2025-02-08 RX ADMIN — MAGNESIUM OXIDE TAB 400 MG (241.3 MG ELEMENTAL MG) 400 MG: 400 (241.3 MG) TAB at 14:13

## 2025-02-08 RX ADMIN — DIAZEPAM 10 MG: 5 INJECTION, SOLUTION INTRAMUSCULAR; INTRAVENOUS at 00:01

## 2025-02-08 RX ADMIN — METOPROLOL SUCCINATE 50 MG: 50 TABLET, EXTENDED RELEASE ORAL at 09:01

## 2025-02-08 RX ADMIN — DIAZEPAM 10 MG: 5 TABLET ORAL at 02:58

## 2025-02-08 RX ADMIN — CLONIDINE HYDROCHLORIDE 0.1 MG: 0.1 TABLET ORAL at 00:50

## 2025-02-08 RX ADMIN — PROCHLORPERAZINE EDISYLATE 10 MG: 5 INJECTION INTRAMUSCULAR; INTRAVENOUS at 02:58

## 2025-02-08 RX ADMIN — CLONIDINE HYDROCHLORIDE 0.1 MG: 0.1 TABLET ORAL at 19:13

## 2025-02-08 RX ADMIN — SUCRALFATE 1 G: 1 SUSPENSION ORAL at 15:50

## 2025-02-08 RX ADMIN — CLONIDINE HYDROCHLORIDE 0.1 MG: 0.1 TABLET ORAL at 09:01

## 2025-02-08 RX ADMIN — FOLIC ACID 1 MG: 1 TABLET ORAL at 00:50

## 2025-02-08 RX ADMIN — SUCRALFATE 1 G: 1 SUSPENSION ORAL at 06:58

## 2025-02-08 RX ADMIN — DIAZEPAM 5 MG: 5 INJECTION, SOLUTION INTRAMUSCULAR; INTRAVENOUS at 05:07

## 2025-02-08 RX ADMIN — THIAMINE HCL TAB 100 MG 100 MG: 100 TAB at 00:50

## 2025-02-08 RX ADMIN — SUCRALFATE 1 G: 1 SUSPENSION ORAL at 11:46

## 2025-02-08 ASSESSMENT — ACTIVITIES OF DAILY LIVING (ADL)
DRESSING/BATHING_DIFFICULTY: NO
ADLS_ACUITY_SCORE: 26
DOING_ERRANDS_INDEPENDENTLY_DIFFICULTY: NO
ADLS_ACUITY_SCORE: 26
WEAR_GLASSES_OR_BLIND: NO
ADLS_ACUITY_SCORE: 26
ADLS_ACUITY_SCORE: 47
PATIENT'S_PREFERRED_MEANS_OF_COMMUNICATION: ENGLISH SPEAKER WITH HEARING LOSS, NO SPEECH PROBLEMS.
ADLS_ACUITY_SCORE: 26
HEARING_DIFFICULTY_OR_DEAF: YES
ADLS_ACUITY_SCORE: 26
ADLS_ACUITY_SCORE: 26
TOILETING_ISSUES: NO
ADLS_ACUITY_SCORE: 26
DIFFICULTY_EATING/SWALLOWING: NO
ADLS_ACUITY_SCORE: 26
ADLS_ACUITY_SCORE: 26
DIFFICULTY_COMMUNICATING: NO
ADLS_ACUITY_SCORE: 26
WALKING_OR_CLIMBING_STAIRS_DIFFICULTY: NO
WERE_AUXILIARY_AIDS_OFFERED?: NO
ADLS_ACUITY_SCORE: 26
ADLS_ACUITY_SCORE: 26
FALL_HISTORY_WITHIN_LAST_SIX_MONTHS: NO
ADLS_ACUITY_SCORE: 47
DESCRIBE_HEARING_LOSS: HEARING LOSS ON LEFT SIDE
ADLS_ACUITY_SCORE: 26
DEPENDENT_IADLS:: INDEPENDENT
CHANGE_IN_FUNCTIONAL_STATUS_SINCE_ONSET_OF_CURRENT_ILLNESS/INJURY: NO
ADLS_ACUITY_SCORE: 26
ADLS_ACUITY_SCORE: 47
CONCENTRATING,_REMEMBERING_OR_MAKING_DECISIONS_DIFFICULTY: NO

## 2025-02-08 ASSESSMENT — LIFESTYLE VARIABLES
PAROXYSMAL SWEATS: 3
ANXIETY: 2
TREMOR: 2
NAUSEA AND VOMITING: NO NAUSEA AND NO VOMITING
AGITATION: NORMAL ACTIVITY
ORIENTATION AND CLOUDING OF SENSORIUM: ORIENTED AND CAN DO SERIAL ADDITIONS
HEADACHE, FULLNESS IN HEAD: MODERATE
AUDITORY DISTURBANCES: NOT PRESENT
VISUAL DISTURBANCES: NOT PRESENT
TOTAL SCORE: 10

## 2025-02-08 NOTE — ED TRIAGE NOTES
History of ETOH abuse. Relapsed recently, believes last drink this afternoon around noon. Reports sob and chest pain.      Triage Assessment (Adult)       Row Name 02/07/25 1933          Triage Assessment    Airway WDL X        Respiratory WDL    Respiratory WDL rhythm/pattern;expansion/retractions     Rhythm/Pattern, Respiratory shortness of breath;tachypneic        Skin Circulation/Temperature WDL    Skin Circulation/Temperature WDL WDL        Cardiac WDL    Cardiac WDL X;chest pain     Cardiac Rhythm ST        Chest Pain Assessment    Chest Pain Location substernal        Peripheral/Neurovascular WDL    Peripheral Neurovascular WDL WDL        Cognitive/Neuro/Behavioral WDL    Cognitive/Neuro/Behavioral WDL WDL

## 2025-02-08 NOTE — H&P
[facility]    History and Physical - Hospitalist Service       Date of Admission:  2/7/2025    Assessment & Plan      Kallie Estrada is a 59 year old female admitted on 2/7/2025. She was brought to the ED by ambulance for evaluation of alcohol withdrawal symptoms    #Alcohol withdrawal  #Alcohol abuse and dependence  -Alcohol level 0.07  -Continue treatment with diazepam per CIWA  -Continue PTA multivitamin, thiamine, folic acid  -SW/CM consult    #Atypical chest pain  #Alcohol gastritis  -Downtrending high-sensitivity troponin T rules out ACS  -ECG personally reviewed: Sinus tachycardia at 111 bpm, nonspecific ST waves, QTc 514 ms; when compared to 7/12/2023 the anterior ST wave abnormalities are resolved but QTc is prolonged  -CT chest abdomen and pelvis without acute findings but unchanged 2 cm cystic lesion in the pancreatic tail  -Sucralfate before meals and at bedtime  -As needed Maalox  -Avoid QT prolonging medications including PPIs and antihistamines for now as well as PTA trazodone  -Clear liquid diet, advance as tolerated    #Hypernatremia, resolved  #High anion gap metabolic acidosis, resolved  -Secondary to alcohol ketoacidosis and dehydration  -Treated with a total of 2 L LR in the ED    #Hypophosphatemia  -Replace electrolytes per protocol    #Essential hypertension  -Worsened by withdrawal syndrome  -Continue PTA clonidine and metoprolol  -IV hydralazine as needed        Diet: Advance Diet as Tolerated: Clear Liquid Diet; Low Saturated Fat Na <2400mg Diet    DVT Prophylaxis: Pneumatic Compression Devices  Powell Catheter: Not present  Lines: None     Cardiac Monitoring: None  Code Status: Full Code          Disposition Plan     Medically Ready for Discharge: Anticipated in 2-4 Days           James Abreu MD  Hospitalist Service  [facility]  Securely message with [employer] (more info)  Text page via [employer] P[employer]      ______________________________________________________________________    Chief Complaint   Tremors, nausea, vomiting, chest pain, shortness of breath, palpitations    History is obtained from the patient, electronic health record, and emergency department physician    History of Present Illness   Kallie Estrada is a 59 year old female who was brought to the ED by ambulance from home for evaluation of alcohol withdrawal symptoms.  Past medical history of alcohol abuse, adjustment disorder with mixed anxiety and depression.  Patient reports sobriety for 10 years until the pandemic in 2020.  She was then sober for a couple years until she relapsed and did rehab at [facility] in 2023.  However, she relapsed again and has been admitted a few times due to alcohol withdrawal and other issues including SI, ESBL UTI, sepsis and atypical chest pain.  Patient reports that she has counselors to follow-up regarding alcohol use disorder.  She binges and drinks 1 bottle of vodka over 6 days.  She denied tobacco or drug use and reported her last alcohol intake was around noon time on 2/7/2025.  Since then, she developed some lower chest pain, palpitations, anxiety, tremors, nausea and vomiting similar to previous withdrawals.  She denied previous seizures.      Past Medical History    History reviewed. No pertinent past medical history.    Past Surgical History   History reviewed. No pertinent surgical history.    Prior to Admission Medications   Prior to Admission Medications   Prescriptions Last Dose Informant Patient Reported? Taking?   amphetamine-dextroamphetamine (ADDERALL XR) 10 MG 24 hr capsule 2/6/2025 Morning  Yes Yes   Sig: Take 10 mg by mouth daily.   amphetamine-dextroamphetamine (ADDERALL) 5 MG tablet 2/6/2025 Noon  Yes Yes   Sig: Take 5 mg by mouth daily at 2 pm.   buPROPion (WELLBUTRIN SR) 150 MG 12 hr tablet 2/6/2025 Morning  Yes Yes   Sig: Take 150 mg by mouth daily.   cloNIDine (CATAPRES) 0.1 MG tablet  2/6/2025 Morning  Yes Yes   Sig: Take 0.1 mg by mouth 2 times daily.   folic acid (FOLVITE) 1 MG tablet 2/6/2025 Morning  No Yes   Sig: Take 1 tablet (1 mg) by mouth daily   metoprolol succinate ER (TOPROL XL) 50 MG 24 hr tablet 2/6/2025 Morning  Yes Yes   Sig: Take 50 mg by mouth daily.   multivitamin w/minerals (THERA-VIT-M) tablet 2/6/2025 Morning  Yes Yes   Sig: Take 1 tablet by mouth daily   promethazine (PHENERGAN) 12.5 MG tablet 2/6/2025 Morning  Yes Yes   Sig: Take 12.5 mg by mouth daily.   thiamine (B-1) 100 MG tablet 2/6/2025 Morning  No Yes   Sig: Take 1 tablet (100 mg) by mouth daily   traZODone (DESYREL) 50 MG tablet 2/6/2025 Evening  Yes Yes   Sig: Take 50 mg by mouth nightly as needed for sleep.      Facility-Administered Medications: None           Physical Exam   Vital Signs: Temp: 97.7  F (36.5  C) Temp src: Oral BP: (!) 193/113 Pulse: 109   Resp: 22 SpO2: 99 % O2 Device: None (Room air)    Weight: 140 lbs 0 oz    Constitutional: mild distress  Respiratory: no increased work of breathing  Cardiovascular: regular rate and rhythm  GI: soft and tenderness noted in the epigastric region  Skin: no bruising or bleeding  Musculoskeletal: no lower extremity pitting edema present    Medical Decision Making       60 MINUTES SPENT BY ME on the date of service doing chart review, history, exam, documentation & further activities per the note.  MANAGEMENT DISCUSSED with the following over the past 24 hours: ED provider and patient       Data     I have personally reviewed the following data over the past 24 hrs:    8.3  \   13.0   / 351     140 103 11.0 /  117 (H)   3.6 23 0.76 \     ALT: 23 AST: 39 AP: 124 TBILI: 0.3   ALB: 4.2 TOT PROTEIN: 6.7 LIPASE: 74 (H)     Trop: 13 BNP: N/A       Imaging results reviewed over the past 24 hrs:   Recent Results (from the past 24 hours)   CT Chest/Abdomen/Pelvis w Contrast   Result Value    Radiologist flags 2.0 cm pancreatic cystic lesion    Narrative    EXAM: CT  CHEST/ABDOMEN/PELVIS W CONTRAST  LOCATION: [facility]  DATE: 2/7/2025    INDICATION: Chest pain. Abdominal pain. Ethanol withdrawal.  COMPARISON: CTA chest 1/30/2025, CT abdomen/pelvis 11/3/2024, 6/27/2024  TECHNIQUE: CT scan of the chest, abdomen, and pelvis was performed following injection of IV contrast. Multiplanar reformats were obtained. Dose reduction techniques were used.   CONTRAST: 69ml isovue 370    FINDINGS:   LUNGS AND PLEURA: No focal airspace disease. No pleural effusion or pneumothorax.    MEDIASTINUM/AXILLAE: No lymphadenopathy. No pericardial effusion.    CORONARY ARTERY CALCIFICATION: Cannot evaluate.    HEPATOBILIARY: Few hepatic cysts. Diffuse hepatic steatosis. Gallbladder appears normal. No biliary dilatation.    PANCREAS: 2.0 cm cystic lesion in the pancreatic tail, unchanged since 6/27/2024. No main pancreatic duct dilatation.    SPLEEN: Redemonstrated rim calcified splenic cyst measuring 3.4 cm.    ADRENAL GLANDS: Normal.    KIDNEYS/BLADDER: Multifocal cortical scarring of the right kidney. Left renal midpole 0.8 cm low-attenuation lesion, too small to characterize, but unchanged. No hydronephrosis. Urinary bladder appears unremarkable.    BOWEL: No obstruction or inflammatory change. Normal appendix. No evidence of acute appendicitis.    LYMPH NODES: No lymphadenopathy.    VASCULATURE: Left-sided IVC.    PELVIC ORGANS: Unremarkable.    MUSCULOSKELETAL: Bilateral breast implants. Multilevel degenerative changes of the spine. Grade 1 anterolisthesis of L4 on L5.      Impression    IMPRESSION:  1.  No acute findings in the chest, abdomen, or pelvis.    2.  Unchanged 2.0 cm cystic lesion in the pancreatic tail. No main pancreatic duct dilatation. Consider follow-up per reference below.      REFERENCE:  Revisions of international consensus [place] guidelines for the management of IPMN of the pancreas. Pancreatology 2017;17(5):738-753.    Largest cyst between 20-30 mm:  EUS in 3-6 months and then annual surveillance alternating between MRI and EUS as appropriate.      [Consider Follow Up: 2.0 cm pancreatic cystic lesion]    This report will be copied to the [facility] to ensure a provider acknowledges the finding.

## 2025-02-08 NOTE — PLAN OF CARE
Ibuprofen given for headache, reports relief.   CIWA protocol, scoring 2 and 2 this evening.  Napping between cares. Tolerating fully advanced diet well, no n/v.    AOx4; VSS on RA; NSR on tele; denies CP, SOB, dizziness.  Able to make needs known, call light in reach.    Moraima Keller RN                Goal Outcome Evaluation:      Plan of Care Reviewed With: patient    Overall Patient Progress: improvingOverall Patient Progress: improving           Problem: Adult Inpatient Plan of Care  Goal: Plan of Care Review  Description: The Plan of Care Review/Shift note should be completed every shift.  The Outcome Evaluation is a brief statement about your assessment that the patient is improving, declining, or no change.  This information will be displayed automatically on your shift  note.  Outcome: Progressing  Flowsheets (Taken 2/8/2025 2227)  Plan of Care Reviewed With: patient  Overall Patient Progress: improving  Goal: Absence of Hospital-Acquired Illness or Injury  Intervention: Identify and Manage Fall Risk  Recent Flowsheet Documentation  Taken 2/8/2025 1545 by Moraima Keller RN  Safety Promotion/Fall Prevention:   activity supervised   assistive device/personal items within reach   safety round/check completed   supervised activity   room near nurse's station   nonskid shoes/slippers when out of bed   clutter free environment maintained     Problem: Skin Injury Risk Increased  Goal: Skin Health and Integrity  Intervention: Optimize Skin Protection  Recent Flowsheet Documentation  Taken 2/8/2025 1545 by Moraima Kellre, RN  Activity Management: activity adjusted per tolerance  Head of Bed (HOB) Positioning: HOB at 20-30 degrees

## 2025-02-08 NOTE — INTERIM SUMMARY
Brief hospitalist note  59-year-old woman admitted for management of alcohol withdrawal symptoms.  Labs reviewed.  Patient examined.  She denies having significant pain or discomfort currently.    Plan  Continue management as per admitting hospitalist    Maciel العراقي MD

## 2025-02-08 NOTE — PHARMACY-ADMISSION MEDICATION HISTORY
Pharmacist Admission Medication History    Admission medication history is complete. The information provided in this note is only as accurate as the sources available at the time of the update.    Information Source(s): Patient via in-person    Pertinent Information: Patient has not had any home medications today PTA 2/7/25 last doses at home yesterday.     Changes made to PTA medication list:  Added: Adderall tablet, adderall xr, bupropion sr, clonidine, metoprolol er 50 , promethazine, trazodone   Deleted: escitalopram, estradiol, hydroxyzine, lamotrigine, medroxyprogesterone, stress tab  Changed:     Allergies reviewed with patient and updates made in EHR: yes    Medication History Completed By: TAYLER ALBERTO RPH 2/7/2025 10:10 PM    PTA Med List   Medication Sig Last Dose/Taking    amphetamine-dextroamphetamine (ADDERALL XR) 10 MG 24 hr capsule Take 10 mg by mouth daily. 2/6/2025 Morning    amphetamine-dextroamphetamine (ADDERALL) 5 MG tablet Take 5 mg by mouth daily at 2 pm. 2/6/2025 Noon    buPROPion (WELLBUTRIN SR) 150 MG 12 hr tablet Take 150 mg by mouth daily. 2/6/2025 Morning    cloNIDine (CATAPRES) 0.1 MG tablet Take 0.1 mg by mouth 2 times daily. 2/6/2025 Morning    folic acid (FOLVITE) 1 MG tablet Take 1 tablet (1 mg) by mouth daily 2/6/2025 Morning    metoprolol succinate ER (TOPROL XL) 50 MG 24 hr tablet Take 50 mg by mouth daily. 2/6/2025 Morning    multivitamin w/minerals (THERA-VIT-M) tablet Take 1 tablet by mouth daily 2/6/2025 Morning    promethazine (PHENERGAN) 12.5 MG tablet Take 12.5 mg by mouth daily. 2/6/2025 Morning    thiamine (B-1) 100 MG tablet Take 1 tablet (100 mg) by mouth daily 2/6/2025 Morning    traZODone (DESYREL) 50 MG tablet Take 50 mg by mouth nightly as needed for sleep. 2/6/2025 Evening

## 2025-02-08 NOTE — ED NOTES
Writer called report to floor P3 to the Nurse (EDNA Mcgill) who will be taking care of the pt. The pt will be transported with a Nurse to  [phone]

## 2025-02-08 NOTE — ED NOTES
Seizure pads in place. Pt very nauseated. Will hold off on oral meds for now. Pt feels very sob , so placing on oxygen at 2 L NC
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
## 2025-02-08 NOTE — CONSULTS
Care Management Initial Consult    General Information  Assessment completed with: Patient,    Type of CM/SW Visit: Initial Assessment    Primary Care Provider verified and updated as needed: Yes   Readmission within the last 30 days: no previous admission in last 30 days      Reason for Consult: substance use concerns  Advance Care Planning: Advance Care Planning Reviewed: no concerns identified          Communication Assessment  Patient's communication style: spoken language (English or Bilingual)    Hearing Difficulty or Deaf: yes (not new)   Wear Glasses or Blind: no    Cognitive  Cognitive/Neuro/Behavioral: .WDL except                      Living Environment:   People in home: alone     Current living Arrangements: house      Able to return to prior arrangements: yes       Family/Social Support:  Care provided by: self  Provides care for: no one     Support system:            Description of Support System:           Current Resources:   Patient receiving home care services: No        Community Resources: Housekeeping/Chore Agency  Equipment currently used at home: none  Supplies currently used at home: None    Employment/Financial:  Employment Status: unemployed        Financial Concerns: none           Does the patient's insurance plan have a 3 day qualifying hospital stay waiver?  No    Lifestyle & Psychosocial Needs:  Social Drivers of Health     Food Insecurity: Low Risk  (2/8/2025)    Food Insecurity     Within the past 12 months, did you worry that your food would run out before you got money to buy more?: No     Within the past 12 months, did the food you bought just not last and you didn t have money to get more?: No   Depression: At risk (3/27/2024)    [facility]    PHQ-2     PHQ-2 TOTAL SCORE: 3   Housing Stability: Low Risk  (2/8/2025)    Housing Stability     Do you have housing? : Yes     Are you worried about losing your housing?: No   Tobacco Use:  Medium Risk (2/7/2025)    Patient History     Smoking Tobacco Use: Former     Smokeless Tobacco Use: Never     Passive Exposure: Not on file   Financial Resource Strain: Low Risk  (2/8/2025)    Financial Resource Strain     Within the past 12 months, have you or your family members you live with been unable to get utilities (heat, electricity) when it was really needed?: No   Alcohol Use: Not on file   Transportation Needs: Low Risk  (2/8/2025)    Transportation Needs     Within the past 12 months, has lack of transportation kept you from medical appointments, getting your medicines, non-medical meetings or appointments, work, or from getting things that you need?: No   Physical Activity: Unknown (8/30/2023)    [facility]    Exercise Vital Sign     Days of Exercise per Week: 2 days     Minutes of Exercise per Session: Not on file   Interpersonal Safety: Low Risk  (2/8/2025)    Interpersonal Safety     Do you feel physically and emotionally safe where you currently live?: Yes     Within the past 12 months, have you been hit, slapped, kicked or otherwise physically hurt by someone?: No     Within the past 12 months, have you been humiliated or emotionally abused in other ways by your partner or ex-partner?: No   Stress: Not on file   Social Connections: Socially Integrated (11/2/2024)    Received from [employer] [facility]    Social Connections     Do you often feel lonely or isolated from those around you?: 0   Health Literacy: Not on file       Functional Status:  Prior to admission patient needed assistance:   Dependent ADLs:: Independent  Dependent IADLs:: Independent       Mental Health Status:  Mental Health Status: Current Concern  Mental Health Management: Psychiatrist    Chemical Dependency Status:  Chemical Dependency Status: Current Concern  Chemical Dependency Management: Previous treatment, Sponsor          Values/Beliefs:  Spiritual, Cultural Beliefs,  Christianity Practices, Values that affect care: no               Discussed  Partnership in Safe Discharge Planning  document with patient/family: No    Additional Information:  CM met with pt in room to discuss discharge planning and complete initial assessment.     Pt lives in a house alone. Pt does not use an assistive device. Pt is independent at baseline.     Pt declines CD resources. She states she was at [facility] 30 day IP treatment in 2023 and now has two counselors that she sees weekly. She reports that she has all of the support and resources she needs at this time.    Next Steps: No further care management intervention anticipated at this time.  Please re-consult if further needs arise.  Care management signing off.      Sofya Parmar, JACESW

## 2025-02-08 NOTE — PLAN OF CARE
Goal Outcome Evaluation:      Plan of Care Reviewed With: patient    Overall Patient Progress: improving    Pt alert n oriented x4, vitals stable except elevated BP. Pt arrived from ED around 2:30 am, able to ambulate with a stand by assist. Scored 8,9 CIWA, Valium given, pt slept most of the night, no hallucinations noted, sinus rhythm on Tele, room air and maintaining levels above 95%. Pt is able to make needs known, care is ongoing.

## 2025-02-08 NOTE — ED NOTES
Bed: JNED-03  Expected date: 2/7/25  Expected time:   Means of arrival:   Comments:  ALLINA; 59F, ETOH W/D, SINUS TACH

## 2025-02-08 NOTE — PLAN OF CARE
Problem: Alcohol Withdrawal  Goal: Alcohol Withdrawal Symptom Control  Outcome: Progressing   Goal Outcome Evaluation:  Denies pain.  NSR on tele.  Scoring a 4 on CIWA. Denies nausea.  Tolerated a clear liquid diet for breakfast and full liquids for lunch.  Advanced to a regular diet for dinner.  Magnesium 1.6.  Replacing per protocol.

## 2025-02-09 LAB
ANION GAP SERPL CALCULATED.3IONS-SCNC: 8 MMOL/L (ref 7–15)
ATRIAL RATE - MUSE: 79 BPM
BUN SERPL-MCNC: 8.4 MG/DL (ref 8–23)
CALCIUM SERPL-MCNC: 8.9 MG/DL (ref 8.8–10.4)
CHLORIDE SERPL-SCNC: 104 MMOL/L (ref 98–107)
CREAT SERPL-MCNC: 0.98 MG/DL (ref 0.51–0.95)
DIASTOLIC BLOOD PRESSURE - MUSE: NORMAL MMHG
EGFRCR SERPLBLD CKD-EPI 2021: 66 ML/MIN/1.73M2
GLUCOSE SERPL-MCNC: 112 MG/DL (ref 70–99)
HCO3 SERPL-SCNC: 26 MMOL/L (ref 22–29)
HOLD SPECIMEN: NORMAL
INTERPRETATION ECG - MUSE: NORMAL
MAGNESIUM SERPL-MCNC: 1.9 MG/DL (ref 1.7–2.3)
P AXIS - MUSE: 60 DEGREES
PHOSPHATE SERPL-MCNC: 2.7 MG/DL (ref 2.5–4.5)
POTASSIUM SERPL-SCNC: 3.9 MMOL/L (ref 3.4–5.3)
PR INTERVAL - MUSE: 170 MS
QRS DURATION - MUSE: 94 MS
QT - MUSE: 426 MS
QTC - MUSE: 488 MS
R AXIS - MUSE: 58 DEGREES
SODIUM SERPL-SCNC: 138 MMOL/L (ref 135–145)
SYSTOLIC BLOOD PRESSURE - MUSE: NORMAL MMHG
T AXIS - MUSE: 60 DEGREES
VENTRICULAR RATE- MUSE: 79 BPM

## 2025-02-09 PROCEDURE — 36415 COLL VENOUS BLD VENIPUNCTURE: CPT | Performed by: STUDENT IN AN ORGANIZED HEALTH CARE EDUCATION/TRAINING PROGRAM

## 2025-02-09 PROCEDURE — 120N000004 HC R&B MS OVERFLOW

## 2025-02-09 PROCEDURE — 99232 SBSQ HOSP IP/OBS MODERATE 35: CPT | Performed by: STUDENT IN AN ORGANIZED HEALTH CARE EDUCATION/TRAINING PROGRAM

## 2025-02-09 PROCEDURE — 250N000013 HC RX MED GY IP 250 OP 250 PS 637: Performed by: HOSPITALIST

## 2025-02-09 PROCEDURE — 83735 ASSAY OF MAGNESIUM: CPT | Performed by: STUDENT IN AN ORGANIZED HEALTH CARE EDUCATION/TRAINING PROGRAM

## 2025-02-09 PROCEDURE — 84100 ASSAY OF PHOSPHORUS: CPT | Performed by: STUDENT IN AN ORGANIZED HEALTH CARE EDUCATION/TRAINING PROGRAM

## 2025-02-09 PROCEDURE — 80048 BASIC METABOLIC PNL TOTAL CA: CPT | Performed by: STUDENT IN AN ORGANIZED HEALTH CARE EDUCATION/TRAINING PROGRAM

## 2025-02-09 PROCEDURE — 250N000013 HC RX MED GY IP 250 OP 250 PS 637: Performed by: STUDENT IN AN ORGANIZED HEALTH CARE EDUCATION/TRAINING PROGRAM

## 2025-02-09 RX ORDER — MAGNESIUM OXIDE 400 MG/1
400 TABLET ORAL EVERY 4 HOURS
Status: COMPLETED | OUTPATIENT
Start: 2025-02-09 | End: 2025-02-09

## 2025-02-09 RX ADMIN — SUCRALFATE 1 G: 1 SUSPENSION ORAL at 06:50

## 2025-02-09 RX ADMIN — SUCRALFATE 1 G: 1 SUSPENSION ORAL at 22:25

## 2025-02-09 RX ADMIN — DIAZEPAM 10 MG: 5 TABLET ORAL at 09:34

## 2025-02-09 RX ADMIN — DIAZEPAM 10 MG: 5 TABLET ORAL at 02:22

## 2025-02-09 RX ADMIN — THIAMINE HCL TAB 100 MG 100 MG: 100 TAB at 09:05

## 2025-02-09 RX ADMIN — SUCRALFATE 1 G: 1 SUSPENSION ORAL at 16:55

## 2025-02-09 RX ADMIN — METOPROLOL SUCCINATE 50 MG: 50 TABLET, EXTENDED RELEASE ORAL at 09:04

## 2025-02-09 RX ADMIN — Medication 1 TABLET: at 09:04

## 2025-02-09 RX ADMIN — DIAZEPAM 10 MG: 5 TABLET ORAL at 22:30

## 2025-02-09 RX ADMIN — CLONIDINE HYDROCHLORIDE 0.1 MG: 0.1 TABLET ORAL at 09:05

## 2025-02-09 RX ADMIN — SUCRALFATE 1 G: 1 SUSPENSION ORAL at 11:41

## 2025-02-09 RX ADMIN — DIAZEPAM 10 MG: 5 TABLET ORAL at 04:36

## 2025-02-09 RX ADMIN — MAGNESIUM OXIDE TAB 400 MG (241.3 MG ELEMENTAL MG) 400 MG: 400 (241.3 MG) TAB at 11:41

## 2025-02-09 RX ADMIN — BUPROPION HYDROCHLORIDE 150 MG: 150 TABLET, FILM COATED, EXTENDED RELEASE ORAL at 09:05

## 2025-02-09 RX ADMIN — CLONIDINE HYDROCHLORIDE 0.1 MG: 0.1 TABLET ORAL at 20:00

## 2025-02-09 RX ADMIN — ACETAMINOPHEN 650 MG: 325 TABLET, FILM COATED ORAL at 09:04

## 2025-02-09 RX ADMIN — DIAZEPAM 10 MG: 5 TABLET ORAL at 00:49

## 2025-02-09 RX ADMIN — MAGNESIUM OXIDE TAB 400 MG (241.3 MG ELEMENTAL MG) 400 MG: 400 (241.3 MG) TAB at 09:04

## 2025-02-09 RX ADMIN — DIAZEPAM 10 MG: 5 TABLET ORAL at 11:40

## 2025-02-09 RX ADMIN — ACETAMINOPHEN 650 MG: 325 TABLET, FILM COATED ORAL at 18:10

## 2025-02-09 RX ADMIN — DIAZEPAM 10 MG: 5 TABLET ORAL at 06:50

## 2025-02-09 RX ADMIN — FOLIC ACID 1 MG: 1 TABLET ORAL at 09:05

## 2025-02-09 ASSESSMENT — ACTIVITIES OF DAILY LIVING (ADL)
ADLS_ACUITY_SCORE: 25
ADLS_ACUITY_SCORE: 25
ADLS_ACUITY_SCORE: 29
ADLS_ACUITY_SCORE: 29
ADLS_ACUITY_SCORE: 26
ADLS_ACUITY_SCORE: 25
ADLS_ACUITY_SCORE: 29
ADLS_ACUITY_SCORE: 29
ADLS_ACUITY_SCORE: 26
ADLS_ACUITY_SCORE: 29
ADLS_ACUITY_SCORE: 25
ADLS_ACUITY_SCORE: 29
ADLS_ACUITY_SCORE: 25
ADLS_ACUITY_SCORE: 25
ADLS_ACUITY_SCORE: 29
ADLS_ACUITY_SCORE: 29
ADLS_ACUITY_SCORE: 25
ADLS_ACUITY_SCORE: 29

## 2025-02-09 NOTE — PLAN OF CARE
Problem: Alcohol Withdrawal  Goal: Alcohol Withdrawal Symptom Control  Outcome: Not Progressing  Continues to score on CIWA protocol- mostly for anxiety and restlessness. Pt very tearful this morning about social stressors and family concerns. Reports that she is planning to see her counselor and seek treatment.   Showered this AM, ambulating in the halls. IV dislodged during shower, per [facility], ok to leave out. Tele discontinued per provider.

## 2025-02-09 NOTE — PROGRESS NOTES
[facility]    Medicine Progress Note - Hospitalist Service    Date of Admission:  2/7/2025    Assessment & Plan   Kallie Estrada is a 59 year old female admitted on 2/7/2025. She was brought to the ED by ambulance for evaluation of alcohol withdrawal symptoms.      Alcohol withdrawal  Alcohol abuse and dependence  Alcohol level 0.07  Continue treatment with diazepam per CIWA  Continue PTA multivitamin, thiamine, folic acid  SW/CM consult.  Patient declined CD resources.  She stated she has adequate support in the community.  Still appears to be having significant anxiety.    Atypical chest pain  Alcohol gastritis  Down trending high-sensitivity troponin T rules out ACS  CT chest abdomen and pelvis without acute findings but unchanged 2 cm cystic lesion in the pancreatic tail  Sucralfate before meals and at bedtime  As needed Maalox    Prolonged QT interval  Avoid QT prolonging medications     Hypernatremia, resolved  High anion gap metabolic acidosis, resolved  -Secondary to alcohol ketoacidosis and dehydration  -Treated with a total of 2 L LR in the ED     Hypophosphatemia  -Replace electrolytes per protocol    Hypomagnesemia  -Replace per protocol     Essential hypertension  -Worsened by withdrawal syndrome  -Continue PTA clonidine and metoprolol  -IV hydralazine as needed             Diet: Advance Diet as Tolerated: Regular Diet Adult; Low Saturated Fat Na <2400mg Diet    DVT Prophylaxis: Ambulate every shift  Powell Catheter: Not present  Lines: None     Cardiac Monitoring: None  Code Status: Full Code      Clinically Significant Risk Factors         # Hypernatremia: Highest Na = 146 mmol/L in last 2 days, will monitor as appropriate   # Hypocalcemia: Lowest Ca = 8.6 mg/dL in last 2 days, will monitor and replace as appropriate   # Hypomagnesemia: Lowest Mg = 1.6 mg/dL in last 2 days, will replace as needed  # Anion Gap Metabolic Acidosis: Highest Anion Gap = 24 mmol/L in last 2 days,  "will monitor and treat as appropriate                 # Overweight: Estimated body mass index is 28 kg/m  as calculated from the following:    Height as of this encounter: 1.626 m (5' 4\").    Weight as of this encounter: 74 kg (163 lb 1.6 oz)., PRESENT ON ADMISSION     # Financial/Environmental Concerns: none         Social Drivers of Health    Depression: At risk (3/27/2024)    Received from [facility]    PHQ-2     PHQ-2 TOTAL SCORE: 3   Tobacco Use: Medium Risk (2/7/2025)    Patient History     Smoking Tobacco Use: Former     Smokeless Tobacco Use: Never   Physical Activity: Unknown (8/30/2023)    [facility]    Exercise Vital Sign     Days of Exercise per Week: 2 days          Disposition Plan     Medically Ready for Discharge: Anticipated in 2-4 Days             Maciel العراقي MD  Hospitalist Service  [facility]  Securely message with [employer] (more info)  Text page via [employer] Paging/Directory   ______________________________________________________________________    Interval History   No distress noted.  Patient appears to be having significant anxiety and tremor.  States she has good community support for alcohol rehabilitation.  Management plan discussed with the patient and she expressed understanding.  Possible discharge tomorrow a.m.    Physical Exam   Vital Signs: Temp: 99  F (37.2  C) Temp src: Oral BP: 123/77 Pulse: 99   Resp: 20 SpO2: 95 % O2 Device: None (Room air)    Weight: 163 lbs 1.6 oz    General Appearance: No distress noted  Respiratory: Good air entry bilaterally  Cardiovascular: S1 and S2 heard  GI: Soft abdomen, no tenderness, normoactive bowel sounds  Skin: Intact and warm       Medical Decision Making       40 MINUTES SPENT BY ME on the date of service doing chart review, history, exam, documentation & further activities per the note.      Data     "

## 2025-02-09 NOTE — PLAN OF CARE
Problem: Alcohol Withdrawal  Goal: Alcohol Withdrawal Symptom Control  Outcome: Progressing     Problem: Alcohol Withdrawal  Goal: Optimal Neurologic Function  Outcome: Progressing     Problem: Alcohol Withdrawal  Goal: Readiness for Change Identified  Outcome: Progressing     Problem: Chest Pain  Goal: Resolution of Chest Pain Symptoms  Outcome: Progressing     Goal Outcome Evaluation:    Pt's vital signs were stable. She was NSR on telemetry. She denied pain throughout the night. She denied chest pain but did c/o feeling short of breath at times d/t anxiety. She is on the CIWA protocol. She has scored 15, 9, 11 and 8. She has been given PRN valium per CIWA orders. She is scoring for tremors, sweats, visual disturbances, anxiety, headache and agitation. She was very anxious and restless at the beginning of the night. She was tearful and upset about her situation. She was able to calm down after talking with staff and receiving PRN medications. She stated that she has a call out to her counselor. She is on the K, Mg and phos protocols. All will be rechecked tomorrow. She is independent in her room. She is able to make needs known. Call light is within reach.

## 2025-02-10 VITALS
HEART RATE: 86 BPM | TEMPERATURE: 97.8 F | BODY MASS INDEX: 28.66 KG/M2 | RESPIRATION RATE: 18 BRPM | DIASTOLIC BLOOD PRESSURE: 63 MMHG | WEIGHT: 167.9 LBS | OXYGEN SATURATION: 100 % | SYSTOLIC BLOOD PRESSURE: 116 MMHG | HEIGHT: 64 IN

## 2025-02-10 LAB
ANION GAP SERPL CALCULATED.3IONS-SCNC: 7 MMOL/L (ref 7–15)
ATRIAL RATE - MUSE: 111 BPM
BUN SERPL-MCNC: 9 MG/DL (ref 8–23)
CALCIUM SERPL-MCNC: 9 MG/DL (ref 8.8–10.4)
CHLORIDE SERPL-SCNC: 105 MMOL/L (ref 98–107)
CREAT SERPL-MCNC: 0.9 MG/DL (ref 0.51–0.95)
DIASTOLIC BLOOD PRESSURE - MUSE: 98 MMHG
EGFRCR SERPLBLD CKD-EPI 2021: 73 ML/MIN/1.73M2
GLUCOSE SERPL-MCNC: 89 MG/DL (ref 70–99)
HCO3 SERPL-SCNC: 26 MMOL/L (ref 22–29)
INTERPRETATION ECG - MUSE: NORMAL
MAGNESIUM SERPL-MCNC: 2.1 MG/DL (ref 1.7–2.3)
P AXIS - MUSE: 60 DEGREES
PHOSPHATE SERPL-MCNC: 4.2 MG/DL (ref 2.5–4.5)
POTASSIUM SERPL-SCNC: 3.9 MMOL/L (ref 3.4–5.3)
PR INTERVAL - MUSE: 146 MS
QRS DURATION - MUSE: 84 MS
QT - MUSE: 378 MS
QTC - MUSE: 514 MS
R AXIS - MUSE: 72 DEGREES
SODIUM SERPL-SCNC: 138 MMOL/L (ref 135–145)
SYSTOLIC BLOOD PRESSURE - MUSE: 169 MMHG
T AXIS - MUSE: 69 DEGREES
VENTRICULAR RATE- MUSE: 111 BPM

## 2025-02-10 PROCEDURE — 36415 COLL VENOUS BLD VENIPUNCTURE: CPT | Performed by: STUDENT IN AN ORGANIZED HEALTH CARE EDUCATION/TRAINING PROGRAM

## 2025-02-10 PROCEDURE — 250N000013 HC RX MED GY IP 250 OP 250 PS 637: Performed by: HOSPITALIST

## 2025-02-10 PROCEDURE — 83735 ASSAY OF MAGNESIUM: CPT | Performed by: STUDENT IN AN ORGANIZED HEALTH CARE EDUCATION/TRAINING PROGRAM

## 2025-02-10 PROCEDURE — 82310 ASSAY OF CALCIUM: CPT | Performed by: STUDENT IN AN ORGANIZED HEALTH CARE EDUCATION/TRAINING PROGRAM

## 2025-02-10 PROCEDURE — 80048 BASIC METABOLIC PNL TOTAL CA: CPT | Performed by: STUDENT IN AN ORGANIZED HEALTH CARE EDUCATION/TRAINING PROGRAM

## 2025-02-10 PROCEDURE — 84100 ASSAY OF PHOSPHORUS: CPT | Performed by: STUDENT IN AN ORGANIZED HEALTH CARE EDUCATION/TRAINING PROGRAM

## 2025-02-10 PROCEDURE — 99239 HOSP IP/OBS DSCHRG MGMT >30: CPT | Performed by: STUDENT IN AN ORGANIZED HEALTH CARE EDUCATION/TRAINING PROGRAM

## 2025-02-10 RX ADMIN — ACETAMINOPHEN 650 MG: 325 TABLET, FILM COATED ORAL at 07:59

## 2025-02-10 RX ADMIN — THIAMINE HCL TAB 100 MG 100 MG: 100 TAB at 08:00

## 2025-02-10 RX ADMIN — DIAZEPAM 10 MG: 5 TABLET ORAL at 00:44

## 2025-02-10 RX ADMIN — SUCRALFATE 1 G: 1 SUSPENSION ORAL at 07:06

## 2025-02-10 RX ADMIN — Medication 1 TABLET: at 08:01

## 2025-02-10 RX ADMIN — CLONIDINE HYDROCHLORIDE 0.1 MG: 0.1 TABLET ORAL at 08:01

## 2025-02-10 RX ADMIN — FOLIC ACID 1 MG: 1 TABLET ORAL at 08:00

## 2025-02-10 RX ADMIN — BUPROPION HYDROCHLORIDE 150 MG: 150 TABLET, FILM COATED, EXTENDED RELEASE ORAL at 08:00

## 2025-02-10 RX ADMIN — Medication 5 MG: at 02:51

## 2025-02-10 RX ADMIN — METOPROLOL SUCCINATE 50 MG: 50 TABLET, EXTENDED RELEASE ORAL at 08:00

## 2025-02-10 ASSESSMENT — ACTIVITIES OF DAILY LIVING (ADL)
ADLS_ACUITY_SCORE: 29

## 2025-02-10 NOTE — PLAN OF CARE
"  Problem: Adult Inpatient Plan of Care  Goal: Plan of Care Review  Description: The Plan of Care Review/Shift note should be completed every shift.  The Outcome Evaluation is a brief statement about your assessment that the patient is improving, declining, or no change.  This information will be displayed automatically on your shift  note.  Outcome: Met  Flowsheets (Taken 2/10/2025 0952)  Plan of Care Reviewed With: patient  Goal: Patient-Specific Goal (Individualized)  Description: You can add care plan individualizations to a care plan. Examples of Individualization might be:  \"Parent requests to be called daily at 9am for status\", \"I have a hard time hearing out of my right ear\", or \"Do not touch me to wake me up as it startles  me\".  Outcome: Met  Goal: Absence of Hospital-Acquired Illness or Injury  Outcome: Met  Intervention: Prevent Skin Injury  Recent Flowsheet Documentation  Taken 2/10/2025 0804 by Pam Hussein, RN  Body Position: position changed independently  Intervention: Prevent Infection  Recent Flowsheet Documentation  Taken 2/10/2025 0856 by Pam Hussein, RN  Infection Prevention: rest/sleep promoted  Goal: Optimal Comfort and Wellbeing  Outcome: Met  Intervention: Monitor Pain and Promote Comfort  Recent Flowsheet Documentation  Taken 2/10/2025 0759 by Pam Hussein, RN  Pain Management Interventions: medication (see MAR)  Goal: Readiness for Transition of Care  Outcome: Met     Problem: Alcohol Withdrawal  Goal: Alcohol Withdrawal Symptom Control  Outcome: Met  Goal: Optimal Neurologic Function  Outcome: Met  Goal: Readiness for Change Identified  Outcome: Met   Goal Outcome Evaluation:      Plan of Care Reviewed With: patient     Patient calm and in good spirits this am. Denies nausea. Has slight headache given tylenol for. Patient anxious to go home. Will call daughter for a ride if discharged today.   "

## 2025-02-10 NOTE — PLAN OF CARE
Problem: Alcohol Withdrawal  Goal: Alcohol Withdrawal Symptom Control  Outcome: Progressing     Problem: Alcohol Withdrawal  Goal: Optimal Neurologic Function  Outcome: Progressing     Problem: Alcohol Withdrawal  Goal: Readiness for Change Identified  Outcome: Progressing     Problem: Chest Pain  Goal: Resolution of Chest Pain Symptoms  Outcome: Progressing   Goal Outcome Evaluation:    Pt's vital signs were stable. She has denied pain throughout the night. She continues to be on the CIWA protocol. She has scored a 9, 6 and 4. She has received her PRN valium x 1. She is up independently in her room. She is able to make needs known. Call light is within reach.

## 2025-02-10 NOTE — DISCHARGE SUMMARY
"[facility]  Hospitalist Discharge Summary      Date of Admission:  2/7/2025  Date of Discharge:  2/10/2025 12:00 PM  Discharging Provider: Maciel العراقي MD  Discharge Service: Hospitalist Service    Discharge Diagnoses   Alcohol withdrawal  Alcohol abuse and dependence  Atypical chest pain  Alcohol gastritis  Prolonged QT interval  Hypernatremia, resolved  High anion gap metabolic acidosis, resolved  Hypophosphatemia, improved  Hypomagnesemia, improved  Essential hypertension    Clinically Significant Risk Factors     # Overweight: Estimated body mass index is 28.82 kg/m  as calculated from the following:    Height as of this encounter: 1.626 m (5' 4\").    Weight as of this encounter: 76.2 kg (167 lb 14.4 oz).       Follow-ups Needed After Discharge   Follow-up Appointments       Hospital Follow-up with Existing Primary Care Provider (PCP)      Please see details below         Schedule Primary Care visit within: 14 Days             Unresulted Labs Ordered in the Past 30 Days of this Admission       No orders found from 1/8/2025 to 2/8/2025.        These results will be followed up by     Discharge Disposition   Discharged to home  Condition at discharge: Stable    Hospital Course   Kallie Estrada is a 59 year old female admitted on 2/7/2025. She was brought to the ED by ambulance for evaluation of alcohol withdrawal symptoms.    Alcohol withdrawal  Alcohol abuse and dependence  Alcohol level 0.07  Treated with diazepam per SHAY  Continue PTA multivitamin, thiamine, folic acid  SW/CM consult.  Patient declined CD resources.  She stated she has adequate support in the community.  Atypical chest pain  Alcohol gastritis  Down trending high-sensitivity troponin T rules out ACS  CT chest abdomen and pelvis without acute findings but unchanged 2 cm cystic lesion in the pancreatic tail  Sucralfate before meals and at bedtime  As needed Maalox  Prolonged QT interval  Avoid QT prolonging " medications   Hypernatremia, resolved  High anion gap metabolic acidosis, resolved  -Secondary to alcohol ketoacidosis and dehydration  -Treated with a total of 2 L LR in the ED   Hypophosphatemia  -Replace electrolytes per protocol  Hypomagnesemia  -Replace per protocol   Essential hypertension  -Worsened by withdrawal syndrome  -Continue PTA clonidine and metoprolol  -IV hydralazine as needed    Patient is clinically stable enough to be discharged home.  She has adequate community resources including AA group.       Consultations This Hospital Stay   CARE MANAGEMENT / SOCIAL WORK IP CONSULT    Code Status   Full Code    Time Spent on this Encounter   I, Maciel العراقي MD, personally saw the patient today and spent greater than 30 minutes discharging this patient.       Maciel العراقي MD  [facility] HE[facility] [address] [address]  Phone: [phone]  Fax: [phone]  ______________________________________________________________________    Physical Exam   Vital Signs: Temp: 98.3  F (36.8  C) Temp src: Oral BP: 116/63 Pulse: 86   Resp: 18 SpO2: 100 % O2 Device: None (Room air)    Weight: 167 lbs 14.4 oz    General Appearance:  No distress noted  Respiratory: Good air entry bilaterally  Cardiovascular: S1 and S2 heard  GI: Soft abdomen, no tenderness, normoactive bowel sounds  Skin: Intact and warm       Primary Care Physician   [facility]    Discharge Orders      Reason for your hospital stay    EtoH withdrawal     Activity    Your activity upon discharge: activity as tolerated     Diet    Follow this diet upon discharge: Current Diet:Orders Placed This Encounter      Advance Diet as Tolerated: Regular Diet Adult     Hospital Follow-up with Existing Primary Care Provider (PCP)    Please see details below            Significant Results and Procedures       Discharge Medications   Current Discharge Medication List        CONTINUE these  medications which have NOT CHANGED    Details   amphetamine-dextroamphetamine (ADDERALL XR) 10 MG 24 hr capsule Take 10 mg by mouth daily.      amphetamine-dextroamphetamine (ADDERALL) 5 MG tablet Take 5 mg by mouth daily at 2 pm.      buPROPion (WELLBUTRIN SR) 150 MG 12 hr tablet Take 150 mg by mouth daily.      cloNIDine (CATAPRES) 0.1 MG tablet Take 0.1 mg by mouth 2 times daily.      folic acid (FOLVITE) 1 MG tablet Take 1 tablet (1 mg) by mouth daily  Qty: 30 tablet, Refills: 0    Associated Diagnoses: Alcohol use disorder, severe, dependence (H)      metoprolol succinate ER (TOPROL XL) 50 MG 24 hr tablet Take 50 mg by mouth daily.      multivitamin w/minerals (THERA-VIT-M) tablet Take 1 tablet by mouth daily      promethazine (PHENERGAN) 12.5 MG tablet Take 12.5 mg by mouth daily.      thiamine (B-1) 100 MG tablet Take 1 tablet (100 mg) by mouth daily  Qty: 30 tablet, Refills: 0    Associated Diagnoses: Alcohol use disorder, severe, dependence (H)      traZODone (DESYREL) 50 MG tablet Take 50 mg by mouth nightly as needed for sleep.           Allergies   Allergies   Allergen Reactions    Eggs [Chicken-Derived Products (Egg)] Anaphylaxis    Sulfa Antibiotics Anaphylaxis

## 2025-02-12 ENCOUNTER — PATIENT OUTREACH (OUTPATIENT)
Dept: CARE COORDINATION | Facility: CLINIC | Age: 60
End: 2025-02-12
Payer: COMMERCIAL

## 2025-02-12 NOTE — PROGRESS NOTES
Connected Care Resource Center Contact  [facility]/Voicemail     Clinical Data: Post-Discharge Outreach     Outreach attempted x 2.  Left message on patient's voicemail, providing [facility]'s central phone number of [phone] ([phone]) for questions/concerns and/or to schedule an appt with an [facility] provider, if they do not have a PCP.      Plan:  [facility] will do no further outreaches at this time.       INGRIS Burgos  Connected Care Resource Center, [facility]    *Connected Care Resource Team does NOT follow patient ongoing. Referrals are identified based on internal discharge reports and the outreach is to ensure patient has an understanding of their discharge instructions.

## 2025-08-16 ENCOUNTER — HEALTH MAINTENANCE LETTER (OUTPATIENT)
Age: 60
End: 2025-08-16